# Patient Record
Sex: MALE | Race: WHITE | NOT HISPANIC OR LATINO | Employment: STUDENT | URBAN - METROPOLITAN AREA
[De-identification: names, ages, dates, MRNs, and addresses within clinical notes are randomized per-mention and may not be internally consistent; named-entity substitution may affect disease eponyms.]

---
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
## 2023-06-03 ENCOUNTER — TELEPHONE (OUTPATIENT)
Dept: DERMATOLOGY | Facility: CLINIC | Age: 18
End: 2023-06-03

## 2023-06-03 NOTE — TELEPHONE ENCOUNTER
Called home phone number since VM is not set up on cell    Spoke with Pt's mother and rescheduled 6/5 visit for virtual with Dr Sonam Kerr and put on AGGIE PATTON [facility]

## 2023-07-19 ENCOUNTER — TELEMEDICINE (OUTPATIENT)
Age: 18
End: 2023-07-19
Payer: COMMERCIAL

## 2023-07-19 DIAGNOSIS — L70.9 ACNE, UNSPECIFIED ACNE TYPE: Primary | ICD-10-CM

## 2023-07-19 PROCEDURE — 99204 OFFICE O/P NEW MOD 45 MIN: CPT | Performed by: DERMATOLOGY

## 2023-07-19 RX ORDER — ADAPALENE AND BENZOYL PEROXIDE .1; 2.5 G/100G; G/100G
1 GEL TOPICAL
Qty: 45 G | Refills: 2 | Status: SHIPPED | OUTPATIENT
Start: 2023-07-19

## 2023-07-19 RX ORDER — DOXYCYCLINE 100 MG/1
100 TABLET ORAL 2 TIMES DAILY
Qty: 60 TABLET | Refills: 2 | Status: SHIPPED | OUTPATIENT
Start: 2023-07-19 | End: 2023-10-17

## 2023-07-19 NOTE — PATIENT INSTRUCTIONS
Treatment plan:Based on a thorough discussion of this condition and the management approach to it (including a comprehensive discussion of the known risks, side effects and potential benefits of treatment), the patient (family) agrees to implement the following specific plan: Topical medications:              Night: Start Epiduo 0.1-2.5%cream topically at bedtime to face until told otherwise     Oral medications:        Morning and Night: Start Doxycycline 100 mg twice a day by mouth after meal and full glass of water for 2 months     Accutane discussed if no improvement    Cetaphil, dove or Neutrogena moisturizer and cleanser recommended twice daily   If too drying okay to apply moisturizer prior to applying topical medication   If still too drying contact office via my chart for alternative. DO NOT WAIT UNTIL NEXT APPOINtment   Oral medication can cause sun sensitivity. Wear sunscreen if outside and hat    Stop medication if going to beach   Follow up in 3 months       REMEMBER:  Always take your acne pills with lots of water! A pill stuck in your throat can cause significant burning and irritation. Drink a full glass of water to ensure the pill gets into your stomach. Avoid “popping” a pill right before bed, and stay upright for at least 1 hour after taking a pill.

## 2023-07-19 NOTE — PROGRESS NOTES
Virtual Regular Visit    Verification of patient location:    Patient is located at Home in the following state in which I hold an active license [place]      Assessment/Plan:    Problem List Items Addressed This Visit    None  Visit Diagnoses     Acne, unspecified acne type    -  Primary               Reason for visit is   Chief Complaint   Patient presents with   • Virtual Regular Visit        Encounter provider Kirsten Monge MD    Provider located at [address] [place] [address]  [phone]      Recent Visits  No visits were found meeting these conditions. Showing recent visits within past 7 days and meeting all other requirements  Today's Visits  Date Type Provider Dept   07/19/23 Telemedicine Kirsten Monge MD [facility]   Showing today's visits and meeting all other requirements  Future Appointments  No visits were found meeting these conditions. Showing future appointments within next 150 days and meeting all other requirements       The patient was identified by name and date of birth. Dawit Gerardo was informed that this is a telemedicine visit and that the visit is being conducted through the Taplet. He agrees to proceed. .  My office door was closed. The patient was notified the following individuals were present in the room Elisha J .  He acknowledged consent and understanding of privacy and security of the video platform. The patient has agreed to participate and understands they can discontinue the visit at any time. Patient is aware this is a billable service. Subjective  Dawit Gerardo is a 21 y.o. male present for acne  . HPI     History reviewed. No pertinent past medical history. History reviewed. No pertinent surgical history. No current outpatient medications on file. No current facility-administered medications for this visit.         No Known Allergies    Review of Systems    Video Exam    There were no vitals filed for this visit. Physical Exam     Visit Time  Total Visit Duration: 5.30 minutes        Patient Name: Dawit Gerardo  Encounter Date: 07/19/2023     Have you been cared for by a [facility]atologist in the last 3 years and, if so, which description applies to you? NO. I am considered a "new" patient and must complete all patient intake questions. I am MALE/not capable of bearing children. REVIEW OF SYSTEMS:  Have you recently had or currently have any of the following? · Recent fever or chills? No  · Any non-healing wound? No   PAST MEDICAL HISTORY:  Have you personally ever had or currently have any of the following? If "YES," then please provide more detail. · Skin cancer (such as Melanoma, Basal Cell Carcinoma, Squamous Cell Carcinoma? No  · Tuberculosis, HIV/AIDS, Hepatitis B or C: No  · Systemic Immunosuppression such as Diabetes, Biologic or Immunotherapy, Chemotherapy, Organ Transplantation, Bone Marrow Transplantation No  · Radiation Treatment No   FAMILY HISTORY:  Any "first degree relatives" (parent, brother, sister, or child) with the following? • Skin Cancer, Pancreatic or Other Cancer? No   PATIENT EXPERIENCE:    • Do you want the Dermatologist to perform a COMPLETE skin exam today including a clinical examination under the "bra and underwear" areas? NO  • If necessary, do we have your permission to call and leave a detailed message on your Preferred Phone number that includes your specific medical information? Yes      No Known Allergies No current outpatient medications on file. • Whom besides the patient is providing clinical information about today's encounter?   o NO ADDITIONAL HISTORIAN (patient alone provided history)    Physical Exam and Assessment/Plan by Diagnosis:    ACNE VULGARIS ("COMMON ACNE")    Physical Exam:  • Anatomic Location Affected:   Face   • Morphological Description:  o Open/Closed Comedones:  - Several ("Moderate")  o Inflammatory Papules/Pustules:  - Few ("Mild")  o Nodules:  - Many ("Severe")  o Scarring:  - Many ("Severe")  o Excoriations:  - Several ("Moderate")  o Local Skin Redness/Erythema:  - Many ("Severe")  o Local Skin Dryness/Scaling:  - No evidence ("Clear")  o Local Skin Dyspigmentation:  - No evidence ("Clear")      Additional History of Present Condition:  In the past tried Syla and Exposed acne kit. He states that has not really helped. Denies any itching or pain     Treatment plan:Based on a thorough discussion of this condition and the management approach to it (including a comprehensive discussion of the known risks, side effects and potential benefits of treatment), the patient (family) agrees to implement the following specific plan: Topical medications:              Night: Start Epiduo 0.1-2.5%cream topically at bedtime to face until told otherwise     Oral medications:        Morning and Night: Start Doxycycline 100 mg twice a day by mouth after meal and full glass of water for 2 months     · Accutane discussed if no improvement    · Cetaphil, dove or Neutrogena moisturizer and cleanser recommended twice daily   · If too drying okay to apply moisturizer prior to applying topical medication   · If still too drying contact office via my chart for alternative. DO NOT WAIT UNTIL NEXT APPOINtment   · Oral medication can cause sun sensitivity. Wear sunscreen if outside and hat    · Stop medication if going to beach   · Follow up in 3 months       REMEMBER:  Always take your acne pills with lots of water! A pill stuck in your throat can cause significant burning and irritation. Drink a full glass of water to ensure the pill gets into your stomach. Avoid “popping” a pill right before bed, and stay upright for at least 1 hour after taking a pill. ACNE:  WHAT ZIT ALL ABOUT? WHY DO I HAVE ACNE/PIMPLES? Your skin is made of layers.   To keep the skin from becoming dry and cracked, the skin needs oil. The oil is made in little wells in the deeper layers in the skin. People with acne have glands that make more oil and are more easily plugged, causing the glands to swell. Hormones, bacteria and your inherited tendency to have acne all play a role. The medical term for “pimples” is acne or acne vulgaris (vulgaris means “common”). Most people get some acne. Acne does not come from being dirty. Instead, it is an expected consequence of changes that occur during normal growth and development. Hormones, bacteria, and your family's tendency to have acne may all play a role. “Whiteheads” or “blackheads” are openings of the glands (glands are the oil factories) onto the surface of the skin. “Blackheads” are not caused by dirt blocking the pores; instead, they result from the oxidation reaction of oil and skin in the pores with the air (like a “rust” reaction). WHAT ABOUT STRESS? Stress does not “cause” acne but it can make it worse. Make sure you get enough sleep and daily exercise! WHAT ABOUT FOODS/DIET? Try to eat a balanced, healthy diet. Some people feel that certain foods worsen their acne. While there aren't many studies available on this question, severe dietary changes are unlikely to help your acne and may be harmful to the health of your skin. If you find that a certain food seems to aggravate your acne, you may consider avoiding that food. Discuss this with your physician! WHAT CAUSES MY ACNE? There are four contributors to acne--the body's natural oil (sebum), clogged pores, bacteria (with the scientific name Propionibacterium acnes, or P. acnes, for short), and the body's reaction to the bacteria living in the clogged pores (which causes inflammation). Here's what happens:    • Sebum is produced in the normal oil-making glands in the deeper layers of the skin and reaches the surface through the skin's pores.  An increase in certain hormones occurs around the time of puberty, and these hormones trigger the oil glands to produce increased amounts of sebum. • Pores with excess oil tend to become clogged more easily. • At the same time, P. acnes--one of the many types of bacteria that normally live on everyone's skin--thrives in the excess oil and causes a skin reaction (inflammation). • If a pore is clogged close to the surface, there is little inflammation. However, this results in the formation of “whiteheads” (closed comedones) or “blackheads” (open comedones) at the surface of the skin. • A plug that extends to, or forms a little deeper in the pore, or one that enlarges or ruptures may cause more inflammation. The result is red bumps (papules) and pus-filled pimples (pustules). • If plugging happens in the deepest skin layer, the inflammation may be even more severe, resulting in the formation of nodules or cysts. When these types of acne heal, they may leave behind discolored areas or true scars. SKIN HYGIENE:  HOW SHOULD I [facility] MY SKIN? Acne does not come from being dirty, however, washing your face is part of taking good care of your skin and will help keep your face clear. Good skin hygiene is, therefore, critical to support any acne treatment plan. Here are several specific suggestions for practicing good skin hygiene and keeping your skin looking its best:    • You should wash acne-prone skin TWICE A DAY: Once in the morning and once in the evening. This does include any showers you take that day, so do not overdo it! • Do not scrub the skin with a washcloth or loofah as these can irritate and inflame your acne. Acne does not come from “dirt”, so it is not necessary to scrub the skin clean. In fact, scrubbing may lead to dryness and irritation that makes the acne even worse and harder for patients to tolerate acne medications.    • Use a gentle facial moisturizing cleanser (Cetaphil Moisturizing Cleanser or Dove Fragrance-Free bar). Avoid using soaps like [place], [address], [address], or soft/liquid soaps as these products will dry your skin. • Do not use any over-the-counter “acne washes” without your doctor's specific instruction to do so. These products often contain salicylic acid or benzoyl peroxide. These ingredients can be helpful in clearing oil from the skin and reducing bacteria, but they may also be drying and can add to irritation. • Do not use exfoliating products with microbeads or brushes as these can cause irritation to the skin which can worsen the acne  • Facials and other treatments to remove, squeeze, or “clean out” pores, if done by a , can help the acne. They should not be done more than every 8 weeks  • Try not to “pop pimples” or pick at your acne as this can delay healing and may result in scarring or skin color changes (“dark spots”) that are often more noticeable than the acne itself. Picking/popping acne can also cause a serious skin infection. • Wash or change your pillow case once to twice a week, especially if you use products in your hair. • Wash the skin as soon as possible after playing sports or other activities that cause a lot of sweating. Also, pay attention to how your sports equipment (shoulder pads, helmet strap, etc.) might be making your acne worse. • When you use makeup, moisturizer, or sunscreen make sure that these products are labeled “non-comedogenic,” or “won't clog pores,” or “won't cause acne.”           WHAT ACNE TREATMENTS ARE AVAILABLE? Medications for acne try to stop the formation of new pimples by reducing or removing the oil, bacteria, and other things (like dead skin cells) that clog the pores. They can also decrease the inflammation or irritation response of the skin to bacteria. It may take from 6 to 8 weeks (about 2 months!) before you see any improvement and know if the medication is effective.  It takes the layers of skin this long to regenerate. Remember, these medications do not “cure” the condition--the acne improves because of the medication. Therefore, treatment must be continued in order to prevent the return of acne lesions. There are many types of acne treatments. Some are applied to the skin (“topical” medications) and some are taken by mouth (“oral” medications). In most cases of mild acne, the doctor will start with a topical medication. There are many different topical medications that are helpful for acne. If acne is more severe and it does not respond adequately to a topical medication, or if it covers large body surface areas such as the back and/or chest, oral antibiotics such as Doxycycline or Minocycline and/or oral hormone therapy such as Oral Contraceptive Pills or Spironolactone may be prescribed. In the most severe cases, isotretinoin (Accutane) may be used. In general, it is usually best to start with acne medications that are least likely to cause side effects but are at the same time capable of addressing the specific causes for the acne. Some patients have a good result with just one medication, but many will need to use a combination of treatments: two or more different topical agents or an oral medication plus a topical medication. Another treatment used for acne may include corticosteroid injections, which are used to help relieve pain, decrease the size, and encourage the healing of large, inflamed acne nodules. Also, dermatologists sometimes perform “acne surgery,” using a fine needle, a pointed blade, or an instrument known as a comedone extractor to mechanically clean out clogged pores. One must always weigh the risk for inducing a scar with the potential benefits of any procedure. Prior treatment with topical retinoids can “loosen” whiteheads and blackheads and make it easier to physically remove such lesions.      Heat-based devices, and light and laser therapy are being studied to see whether there is any role for such treatments in mild to moderate acne. At this time, there is not enough evidence to make general recommendations about their use. TOPICAL ACNE MEDICATIONS    WHAT KIND OF TOPICALS ARE THERE? • Benzoyl peroxide (BP) helps to fight inflammation and is anti-microbial (kills bacteria, viruses, and other microorganisms) and is believed to help prevent resistance of bacteria to topical antibiotics. A benzoyl peroxide “wash” may be recommended for use on large areas such as the chest and/or back. Mild irritation and dryness are common when first using benzoyl peroxide-containing products. Be careful because benzoyl peroxide can bleach towels and clothing! • Retinoids (such as adapalene, tretinoin, or tazarotene) unplug the oil glands by helping peel away the layers of skin and other things plugging the opening of the glands. Mild irritation and dryness are common when first using these products. Facial waxing and other skin procedures can lead to excessive irritation and should be avoided during retinoid therapy. • Antibiotics fight bacteria and help decrease inflammation. Topical antibiotics commonly used in acne include clindamycin, erythromycin, and combination agents (such as clindamycin/benzoyl peroxide or erythromycin/benzoyl peroxide). Mild irritation and dryness are common when first using these products. Typically, topical antibiotics should not be used alone as treatment for acne. • Other topical agents include salicylic acid, azelaic acid, dapsone, and sulfacetamide. Mild irritation and dryness can also occur when first using these products. USING YOUR TOPICAL TREATMENTS LIKE A PRO  • Apply topical medications only to clean, dry skin. Topical medications may lead to significant dryness of the affected areas. To minimize this, wait 15-20 minutes after washing before applying your topical medication. • These medications work deep in the skin to prevent new breakouts. “Spot treatment” of individual pimples does not do much. When applying topical medications to the face, use the “5-dot” method. Start by placing a small pea-sized amount of the medication on your finger. Then, place “dots” in each of five locations of your face: Mid-forehead, each cheek, nose, and chin. Next, rub the medication into the entire area of skin - not just on individual pimples! Try to avoid the delicate skin around your eyes and corners of your mouth. • The medications are not magic! They take weeks if not months to work. Be patient and use your medicine on a daily basis or as directed for six weeks before asking if your skin looks better. Try not to miss more than one or two days each week when using your medications. • If you are starting a new medication, then try using it “every other night” or even “every third night.” Gradually work up to [employer] a day.”  This will give your skin time to adjust.  • The same medications often come in various forms or formulations: Creams, ointments, lotions, gels, microspheres, or foams. Use the formulation that has been recommended and don't switch to other forms unless instructed. Some forms (such as alcohol based gels) may be more drying and less tolerable for certain skin types. • Sometimes individual medications are not as effective as a combination of two or more agents. The doctor may need to try several medications or combinations before finding the one that is best for that patient. • Moisturizer, sunscreen, and make-up may be used in conjunction with topical acne medications. In general, acne medications are applied first so they may directly contact the skin. Ask your physician to review specific application instructions! • It is especially important to always use sunscreen when using a topical retinoid or oral antibiotic. These drugs can make your skin more sensitive to the sun. In general, sunscreen gets applied AFTER any acne medications.   • Don't stop using your acne medications just because your acne got better. Remember, the acne is better because of the medication, and prevention is the franklin to treatment. ORAL ACNE MEDICATIONS    ORAL ANTIBIOTICS  Antibiotics include tetracycline-class medicines (which include the most commonly used oral antibiotics for acne, minocycline, and doxycycline), erythromycin, trimethoprim-sulfamethoxazole, and occasionally cephalexin or azithromycin. These drugs may decrease bacteria and inflammation, and they are most effective for moderate-to-severe “inflammatory” acne. A product containing benzoyl peroxide should be used along with these antibiotics to help decrease the possibility of microbial resistance. Always take your acne pills with lots of water! A pill stuck in your throat can cause significant burning and irritation. Drink a full glass of water to ensure the pill gets into your stomach. Avoid “popping” a pill right before bed, and stay upright for at least 1 hour after taking a pill. ORAL ISOTRETINOIN (used to be called the brand name “ACCUTANE”)  Isotretinoin, a derivative of vitamin A, is a powerful drug with several significant potential side effects. It is reserved for acne which is severe or when other medications have not worked well enough. It used to be sold under the brand name “Accutane” but now several versions exist.      HAVING PROBLEMS WITH ANY OF YOUR TREATMENTS? You should not be able to see any of the medicines on your face. If you can see a white film on your skin after you apply the medication, there is too much medicine in that area and you need to apply a thinner coat and make sure it is spread evenly on your face. If your skin gets too dry, you can apply a light (“non-comedogenic”) moisturizer on top of your medicine or you may switch to using the medicine every other day instead of every day.   If your skin is still too irritated, you may need to switch to a milder medication. If your skin is red and very itchy, you may be allergic to the medication and you should stop using it. WHEN AND WHERE TO CALL WITH CONCERNS  We are here to help! If you experience any unusual symptoms, then stop taking or using the medication and call our office at [phone] (SKIN). It is better to be safe than to be sorry!     Scribe Attestation    I,:  Diego Leger am acting as a scribe while in the presence of the attending physician.:       I,:  Zay Smiley MD personally performed the services described in this documentation    as scribed in my presence.:

## 2023-08-01 ENCOUNTER — OFFICE VISIT (OUTPATIENT)
Dept: FAMILY MEDICINE CLINIC | Facility: CLINIC | Age: 18
End: 2023-08-01
Payer: COMMERCIAL

## 2023-08-01 VITALS
HEART RATE: 48 BPM | DIASTOLIC BLOOD PRESSURE: 62 MMHG | OXYGEN SATURATION: 99 % | TEMPERATURE: 96 F | BODY MASS INDEX: 21.06 KG/M2 | SYSTOLIC BLOOD PRESSURE: 122 MMHG | RESPIRATION RATE: 18 BRPM | HEIGHT: 71 IN | WEIGHT: 150.4 LBS

## 2023-08-01 DIAGNOSIS — L70.9 ACNE, UNSPECIFIED ACNE TYPE: ICD-10-CM

## 2023-08-01 DIAGNOSIS — Z00.00 ANNUAL PHYSICAL EXAM: Primary | ICD-10-CM

## 2023-08-01 PROCEDURE — 99395 PREV VISIT EST AGE 18-39: CPT | Performed by: FAMILY MEDICINE

## 2023-08-01 NOTE — PROGRESS NOTES
[id number] [facility]    NAME: Noreen Kaufman  AGE: 21 y.o. SEX: male  : 2005     DATE: 2023     Assessment and Plan:     Problem List Items Addressed This Visit        Musculoskeletal and Integument    Acne     Patient follows with derm and is on medication         Other Visit Diagnoses     Annual physical exam    -  Primary          Immunizations and preventive care screenings were discussed with patient today. Appropriate education was printed on patient's after visit summary. Counseling:  Alcohol/drug use: discussed moderation in alcohol intake, the recommendations for healthy alcohol use, and avoidance of illicit drug use. Dental Health: discussed importance of regular tooth brushing, flossing, and dental visits. Injury prevention: discussed safety/seat belts, safety helmets, smoke detectors, carbon dioxide detectors, and smoking near bedding or upholstery. Sexual health: discussed sexually transmitted diseases, partner selection, use of condoms, avoidance of unintended pregnancy, and contraceptive alternatives. · Exercise: the importance of regular exercise/physical activity was discussed. Recommend exercise 3-5 times per week for at least 30 minutes. Return in about 1 year (around 2024) for Annual physical.     Chief Complaint:     Chief Complaint   Patient presents with   • Annual Exam      History of Present Illness:     Adult Annual Physical   Patient here for a comprehensive physical exam.   He is a student at [employer]. Running track and Eat. Diet and Physical Activity  · Diet/Nutrition: well balanced diet. · Exercise: moderate cardiovascular exercise.       Depression Screening  PHQ-2/9 Depression Screening    Little interest or pleasure in doing things: 0 - not at all  Feeling down, depressed, or hopeless: 0 - not at all  PHQ-2 Score: 0  PHQ-2 Interpretation: Negative depression screen          Review of Systems:     Review of Systems   Constitutional: Negative for chills and fever. HENT: Negative for congestion, postnasal drip, rhinorrhea and sinus pain. Eyes: Negative for photophobia and visual disturbance. Respiratory: Negative for cough and shortness of breath. Cardiovascular: Negative for chest pain, palpitations and leg swelling. Gastrointestinal: Negative for abdominal pain, constipation, diarrhea, nausea and vomiting. Genitourinary: Negative for difficulty urinating and dysuria. Musculoskeletal: Negative for arthralgias and myalgias. Skin: Negative for rash. Neurological: Negative for dizziness and syncope. Past Medical History:     History reviewed. No pertinent past medical history. Past Surgical History:     History reviewed. No pertinent surgical history. Social History:     Social History     Socioeconomic History   • Marital status: Single     Spouse name: None   • Number of children: None   • Years of education: None   • Highest education level: None   Occupational History   • None   Tobacco Use   • Smoking status: Never   • Smokeless tobacco: Never   Vaping Use   • Vaping Use: Never used   Substance and Sexual Activity   • Alcohol use: Never   • Drug use: Never   • Sexual activity: None   Other Topics Concern   • None   Social History Narrative   • None     Social Determinants of Health     Financial Resource Strain: Not on file   Food Insecurity: Not on file   Transportation Needs: Not on file   Physical Activity: Not on file   Stress: Not on file   Social Connections: Not on file   Intimate Partner Violence: Not At Risk (6/30/2022)    Humiliation, Afraid, Rape, and Kick questionnaire    • Fear of Current or Ex-Partner: No    • Emotionally Abused: No    • Physically Abused: No    • Sexually Abused: No   Housing Stability: Not on file      Family History:     History reviewed. No pertinent family history. Current Medications:     Current Outpatient Medications   Medication Sig Dispense Refill   • Adapalene-Benzoyl Peroxide 0.1-2.5 % gel Apply 1 Application topically daily at bedtime 45 g 2   • doxycycline (ADOXA) 100 MG tablet Take 1 tablet (100 mg total) by mouth 2 (two) times a day 60 tablet 2     No current facility-administered medications for this visit. Allergies:     No Known Allergies   Physical Exam:     /62 (BP Location: Left arm, Patient Position: Sitting, Cuff Size: Standard)   Pulse (!) 48   Temp (!) 96 °F (35.6 °C) (Tympanic)   Resp 18   Ht 5' 10.8" (1.798 m)   Wt 68.2 kg (150 lb 6.4 oz)   SpO2 99%   BMI 21.10 kg/m²     Physical Exam  Constitutional:       General: He is not in acute distress. Appearance: Normal appearance. He is not ill-appearing, toxic-appearing or diaphoretic. HENT:      Head: Normocephalic and atraumatic. Right Ear: Tympanic membrane and ear canal normal.      Left Ear: Tympanic membrane and ear canal normal.      Nose: Nose normal. No congestion. Mouth/Throat:      Mouth: Mucous membranes are moist.      Pharynx: Oropharynx is clear. No oropharyngeal exudate. Eyes:      Extraocular Movements: Extraocular movements intact. Conjunctiva/sclera: Conjunctivae normal.      Pupils: Pupils are equal, round, and reactive to light. Cardiovascular:      Rate and Rhythm: Normal rate and regular rhythm. Pulses: Normal pulses. Heart sounds: No murmur heard. Pulmonary:      Effort: Pulmonary effort is normal.      Breath sounds: Normal breath sounds. No wheezing, rhonchi or rales. Abdominal:      General: Bowel sounds are normal. There is no distension. Palpations: Abdomen is soft. Tenderness: There is no abdominal tenderness. Musculoskeletal:         General: No swelling or tenderness. Normal range of motion. Cervical back: Normal range of motion and neck supple. Skin:     General: Skin is warm and dry.       Capillary Refill: Capillary refill takes less than 2 seconds. Neurological:      General: No focal deficit present. Mental Status: He is alert and oriented to person, place, and time. Cranial Nerves: No cranial nerve deficit. Psychiatric:         Mood and Affect: Mood normal.         Behavior: Behavior normal.         Thought Content:  Thought content normal.          Jared Mckeon DO   [facility]

## 2023-10-25 ENCOUNTER — TELEMEDICINE (OUTPATIENT)
Age: 18
End: 2023-10-25
Payer: COMMERCIAL

## 2023-10-25 DIAGNOSIS — L70.9 ACNE, UNSPECIFIED ACNE TYPE: Primary | ICD-10-CM

## 2023-10-25 PROCEDURE — 99214 OFFICE O/P EST MOD 30 MIN: CPT | Performed by: DERMATOLOGY

## 2023-10-25 RX ORDER — DOXYCYCLINE 100 MG/1
100 TABLET ORAL 2 TIMES DAILY
Qty: 180 TABLET | Refills: 0 | Status: SHIPPED | OUTPATIENT
Start: 2023-10-25 | End: 2024-01-23

## 2023-10-25 NOTE — PATIENT INSTRUCTIONS
Topical medications:                                                           Night: Continue Epiduo 0.1-2.5%cream topically at bedtime to face until told otherwise      Oral medications:                             Morning and Night: Continue Doxycycline 100 mg twice a day by mouth after meal and full glass of water for the next 3  months          Advised to moisturize and cleanse face twice daily   Reassured it Is okay to apply Epiduo Monday through Friday and take the weekends off    Oral medication can cause sun sensitivity. Wear sunscreen if outside and hat     Accutane discussed if still no improvement   Follow up in March or April 2024                      REMEMBER:  Always take your acne pills with lots of water! A pill stuck in your throat can cause significant burning and irritation. Drink a full glass of water to ensure the pill gets into your stomach. Avoid “popping” a pill right before bed, and stay upright for at least 1 hour after taking a pill.

## 2023-10-25 NOTE — PROGRESS NOTES
Virtual Regular Visit    Verification of patient location:    Patient is located at Other in the following state in which I hold an active license [place]      Assessment/Plan:    Problem List Items Addressed This Visit        Musculoskeletal and Integument    Acne - Primary    Relevant Medications    doxycycline (ADOXA) 100 MG tablet            Reason for visit is No chief complaint on file. Encounter provider Ellen Hernandez MD    Provider located at [address] [place] [address]  [address] [facility] [address] [phone]      Recent Visits  No visits were found meeting these conditions. Showing recent visits within past 7 days and meeting all other requirements  Today's Visits  Date Type Provider Dept   10/25/23 Claritza CALI MD [facility]   Showing today's visits and meeting all other requirements  Future Appointments  No visits were found meeting these conditions. Showing future appointments within next 150 days and meeting all other requirements       The patient was identified by name and date of birth. Mulu Kiser was informed that this is a telemedicine visit and that the visit is being conducted through the [employer]. He agrees to proceed. .  My office door was closed. The patient was notified the following individuals were present in the room Elisha J.  He acknowledged consent and understanding of privacy and security of the video platform. The patient has agreed to participate and understands they can discontinue the visit at any time. Patient is aware this is a billable service. Subjective  Mulu Kiser is a 21 y.o. male following up on acne  . HPI     History reviewed. No pertinent past medical history. History reviewed. No pertinent surgical history.     Current Outpatient Medications   Medication Sig Dispense Refill   • doxycycline (ADOXA) 100 MG tablet Take 1 tablet (100 mg total) by mouth 2 (two) times a day 180 tablet 0   • Adapalene-Benzoyl Peroxide 0.1-2.5 % gel Apply 1 Application topically daily at bedtime 45 g 2     No current facility-administered medications for this visit. No Known Allergies    Review of Systems    Video Exam    There were no vitals filed for this visit. Physical Exam     Visit Time  Total Visit Duration: 10 minutes   Patient Name: Vonnie Schaumann  Encounter Date: 10/25/2023     Have you been cared for by a [facility]ologist in the last 3 years and, if so, which description applies to you? Yes. I have been here within the last 3 years, and my medical history has NOT changed since that time. I am MALE/not capable of bearing children. REVIEW OF SYSTEMS:  Have you recently had or currently have any of the following? No changes in my recent health. PAST MEDICAL HISTORY:  Have you personally ever had or currently have any of the following? If "YES," then please provide more detail. No changes in my medical history. HISTORY OF IMMUNOSUPPRESSION: Do you have a history of any of the following:  Systemic Immunosuppression such as Diabetes, Biologic or Immunotherapy, Chemotherapy, Organ Transplantation, Bone Marrow Transplantation? No     Answering "YES" requires the addition of the dotphrase "IMMUNOSUPPRESSED" as the first diagnosis of the patient's visit. FAMILY HISTORY:  Any "first degree relatives" (parent, brother, sister, or child) with the following? No changes in my family's known health. PATIENT EXPERIENCE:    Do you want the Dermatologist to perform a COMPLETE skin exam today including a clinical examination under the "bra and underwear" areas? NO  If necessary, do we have your permission to call and leave a detailed message on your Preferred Phone number that includes your specific medical information?   Yes      No Known Allergies   Current Outpatient Medications:   •  doxycycline (ADOXA) 100 MG tablet, Take 1 tablet (100 mg total) by mouth 2 (two) times a day, Disp: 180 tablet, Rfl: 0  •  Adapalene-Benzoyl Peroxide 0.1-2.5 % gel, Apply 1 Application topically daily at bedtime, Disp: 45 g, Rfl: 2          Whom besides the patient is providing clinical information about today's encounter? NO ADDITIONAL HISTORIAN (patient alone provided history)    Physical Exam and Assessment/Plan by Diagnosis:    ACNE VULGARIS ("COMMON ACNE")    Physical Exam:  Anatomic Location Affected:  face   Morphological Description:  Open/Closed Comedones:  Few ("Mild")  Inflammatory Papules/Pustules:  Few ("Mild")  Nodules:  Several ("Moderate")  Scarring:  Several ("Moderate")  Excoriations:  Few ("Mild")  Local Skin Redness/Erythema:  Several ("Moderate")  Local Skin Dryness/Scaling:  Few ("Mild")  Local Skin Dyspigmentation:  Rare ("Almost Clear")      Additional History of Present Condition:  last seen 7/2023. Prescribed epiduo to apply at bedtime, doxycycline 100 bid. He states his face does look better. Breakouts have been minimal. He states he noticed when he sweats his face does get itchy. Denies any side effects with topical and oral medication     Treatment plan:Based on a thorough discussion of this condition and the management approach to it (including a comprehensive discussion of the known risks, side effects and potential benefits of treatment), the patient (family) agrees to implement the following specific plan:   Topical medications:                                                           Night: Continue Epiduo 0.1-2.5%cream topically at bedtime to face until told otherwise      Oral medications:                             Morning and Night: Continue Doxycycline 100 mg twice a day by mouth after meal and full glass of water for the next 3  months          Advised to moisturize and cleanse face twice daily   Reassured it Is okay to apply Epiduo Monday through Friday and take the weekends off    Oral medication can cause sun sensitivity. Wear sunscreen if outside and hat     Accutane discussed if still no improvement   Follow up in March or April 2024                      REMEMBER:  Always take your acne pills with lots of water! A pill stuck in your throat can cause significant burning and irritation. Drink a full glass of water to ensure the pill gets into your stomach. Avoid “popping” a pill right before bed, and stay upright for at least 1 hour after taking a pill.     Scribe Attestation    I,:  Sarah Schofield am acting as a scribe while in the presence of the attending physician.:       I,:  Eddi Watt MD personally performed the services described in this documentation    as scribed in my presence.:

## 2023-12-18 ENCOUNTER — OFFICE VISIT (OUTPATIENT)
Dept: FAMILY MEDICINE CLINIC | Facility: CLINIC | Age: 18
End: 2023-12-18
Payer: COMMERCIAL

## 2023-12-18 ENCOUNTER — APPOINTMENT (OUTPATIENT)
Dept: LAB | Facility: CLINIC | Age: 18
End: 2023-12-18
Payer: COMMERCIAL

## 2023-12-18 VITALS
WEIGHT: 158.4 LBS | OXYGEN SATURATION: 98 % | DIASTOLIC BLOOD PRESSURE: 66 MMHG | TEMPERATURE: 96.6 F | HEART RATE: 57 BPM | SYSTOLIC BLOOD PRESSURE: 120 MMHG | BODY MASS INDEX: 22.18 KG/M2 | HEIGHT: 71 IN | RESPIRATION RATE: 16 BRPM

## 2023-12-18 DIAGNOSIS — G44.52 NEW DAILY PERSISTENT HEADACHE: Primary | ICD-10-CM

## 2023-12-18 DIAGNOSIS — G44.52 NEW DAILY PERSISTENT HEADACHE: ICD-10-CM

## 2023-12-18 LAB
ALBUMIN SERPL BCP-MCNC: 4.2 G/DL (ref 3.5–5)
ALP SERPL-CCNC: 111 U/L (ref 34–104)
ALT SERPL W P-5'-P-CCNC: 36 U/L (ref 7–52)
ANION GAP SERPL CALCULATED.3IONS-SCNC: 5 MMOL/L
AST SERPL W P-5'-P-CCNC: 46 U/L (ref 13–39)
BASOPHILS # BLD AUTO: 0.02 THOUSANDS/ÂΜL (ref 0–0.1)
BASOPHILS NFR BLD AUTO: 0 % (ref 0–1)
BILIRUB SERPL-MCNC: 0.79 MG/DL (ref 0.2–1)
BUN SERPL-MCNC: 17 MG/DL (ref 5–25)
CALCIUM SERPL-MCNC: 9.5 MG/DL (ref 8.4–10.2)
CHLORIDE SERPL-SCNC: 102 MMOL/L (ref 96–108)
CO2 SERPL-SCNC: 32 MMOL/L (ref 21–32)
CREAT SERPL-MCNC: 0.72 MG/DL (ref 0.6–1.3)
EOSINOPHIL # BLD AUTO: 0.06 THOUSAND/ÂΜL (ref 0–0.61)
EOSINOPHIL NFR BLD AUTO: 1 % (ref 0–6)
ERYTHROCYTE [DISTWIDTH] IN BLOOD BY AUTOMATED COUNT: 12.2 % (ref 11.6–15.1)
GFR SERPL CREATININE-BSD FRML MDRD: 134 ML/MIN/1.73SQ M
GLUCOSE P FAST SERPL-MCNC: 91 MG/DL (ref 65–99)
HCT VFR BLD AUTO: 40.4 % (ref 36.5–49.3)
HGB BLD-MCNC: 13.3 G/DL (ref 12–17)
IMM GRANULOCYTES # BLD AUTO: 0.01 THOUSAND/UL (ref 0–0.2)
IMM GRANULOCYTES NFR BLD AUTO: 0 % (ref 0–2)
LYMPHOCYTES # BLD AUTO: 1.47 THOUSANDS/ÂΜL (ref 0.6–4.47)
LYMPHOCYTES NFR BLD AUTO: 30 % (ref 14–44)
MAGNESIUM SERPL-MCNC: 2 MG/DL (ref 1.9–2.7)
MCH RBC QN AUTO: 32.9 PG (ref 26.8–34.3)
MCHC RBC AUTO-ENTMCNC: 32.9 G/DL (ref 31.4–37.4)
MCV RBC AUTO: 100 FL (ref 82–98)
MONOCYTES # BLD AUTO: 0.55 THOUSAND/ÂΜL (ref 0.17–1.22)
MONOCYTES NFR BLD AUTO: 11 % (ref 4–12)
NEUTROPHILS # BLD AUTO: 2.81 THOUSANDS/ÂΜL (ref 1.85–7.62)
NEUTS SEG NFR BLD AUTO: 58 % (ref 43–75)
NRBC BLD AUTO-RTO: 0 /100 WBCS
PLATELET # BLD AUTO: 221 THOUSANDS/UL (ref 149–390)
PMV BLD AUTO: 10.1 FL (ref 8.9–12.7)
POTASSIUM SERPL-SCNC: 4.2 MMOL/L (ref 3.5–5.3)
PROT SERPL-MCNC: 6.8 G/DL (ref 6.4–8.4)
RBC # BLD AUTO: 4.04 MILLION/UL (ref 3.88–5.62)
SODIUM SERPL-SCNC: 139 MMOL/L (ref 135–147)
TSH SERPL DL<=0.05 MIU/L-ACNC: 1.53 UIU/ML (ref 0.45–4.5)
WBC # BLD AUTO: 4.92 THOUSAND/UL (ref 4.31–10.16)

## 2023-12-18 PROCEDURE — 80053 COMPREHEN METABOLIC PANEL: CPT

## 2023-12-18 PROCEDURE — 99214 OFFICE O/P EST MOD 30 MIN: CPT | Performed by: FAMILY MEDICINE

## 2023-12-18 PROCEDURE — 36415 COLL VENOUS BLD VENIPUNCTURE: CPT

## 2023-12-18 PROCEDURE — 85025 COMPLETE CBC W/AUTO DIFF WBC: CPT

## 2023-12-18 PROCEDURE — 83735 ASSAY OF MAGNESIUM: CPT

## 2023-12-18 PROCEDURE — 84443 ASSAY THYROID STIM HORMONE: CPT

## 2023-12-18 RX ORDER — PREDNISONE 20 MG/1
40 TABLET ORAL DAILY
Qty: 10 TABLET | Refills: 0 | Status: SHIPPED | OUTPATIENT
Start: 2023-12-18 | End: 2023-12-23

## 2023-12-18 NOTE — PROGRESS NOTES
Amrit Dolan 2005 male MRN: [id number]    Family Medicine Acute Visit    ASSESSMENT/PLAN  Problem List Items Addressed This Visit          Other    New daily persistent headache - Primary    Relevant Medications    predniSONE 20 mg tablet    Other Relevant Orders    Comprehensive metabolic panel    CBC and differential    TSH, 3rd generation with Free T4 reflex    Magnesium       Rafa has headache unrelieved by OTC medication. Will try to break cycle with a short course of steroid. Get labs done. If not improved will then check imaging, referral to neuro, trial of additional medication. ER precautions reviewed.       No future appointments.       SUBJECTIVE  CC: Headache (Sometimes dizzy, and nauseated before going to sleep for 2-3 weeks)      HPI:  Amrit Dolan is a 18 y.o. male who presents for headache  Reports a few weeks ago was starting to get headaches, feels like they are getting worse. They are almost daily. Wakes up with headache that lasts all day. He tried otc migraine medicine, tried advil which did not give him any relief. No trauma or injury.  He does feel some nausea as well.     Review of Systems   Constitutional:  Negative for chills and fever.   HENT:  Negative for congestion, postnasal drip, rhinorrhea and sinus pain.    Eyes:  Negative for photophobia and visual disturbance.   Respiratory:  Negative for cough and shortness of breath.    Cardiovascular:  Negative for chest pain, palpitations and leg swelling.   Gastrointestinal:  Positive for nausea. Negative for abdominal pain, constipation, diarrhea and vomiting.   Genitourinary:  Negative for difficulty urinating and dysuria.   Musculoskeletal:  Negative for arthralgias and myalgias.   Skin:  Negative for rash.   Neurological:  Positive for headaches. Negative for dizziness and syncope.       Historical Information   The patient history was reviewed as follows:  No past medical history on file.      No past surgical  "history on file.  No family history on file.   Social History   Social History     Substance and Sexual Activity   Alcohol Use Never     Social History     Substance and Sexual Activity   Drug Use Never     Social History     Tobacco Use   Smoking Status Never   Smokeless Tobacco Never       Medications:     Current Outpatient Medications:     Adapalene-Benzoyl Peroxide 0.1-2.5 % gel, Apply 1 Application topically daily at bedtime, Disp: 45 g, Rfl: 2    doxycycline (ADOXA) 100 MG tablet, Take 1 tablet (100 mg total) by mouth 2 (two) times a day, Disp: 180 tablet, Rfl: 0    predniSONE 20 mg tablet, Take 2 tablets (40 mg total) by mouth daily for 5 days, Disp: 10 tablet, Rfl: 0    No Known Allergies    OBJECTIVE  Vitals:   Vitals:    12/18/23 0827   BP: 120/66   BP Location: Left arm   Patient Position: Sitting   Cuff Size: Standard   Pulse: 57   Resp: 16   Temp: (!) 96.6 °F (35.9 °C)   TempSrc: Tympanic   SpO2: 98%   Weight: 71.8 kg (158 lb 6.4 oz)   Height: 5' 10.8\" (1.798 m)         Physical Exam  Constitutional:       Appearance: He is well-developed.   HENT:      Head: Normocephalic and atraumatic.      Right Ear: External ear normal.      Left Ear: External ear normal.      Nose: No congestion or rhinorrhea.   Eyes:      Extraocular Movements: Extraocular movements intact.      Conjunctiva/sclera: Conjunctivae normal.      Pupils: Pupils are equal, round, and reactive to light.   Cardiovascular:      Rate and Rhythm: Normal rate and regular rhythm.      Heart sounds: Normal heart sounds. No murmur heard.  Pulmonary:      Effort: Pulmonary effort is normal. No respiratory distress.      Breath sounds: Normal breath sounds. No wheezing.   Musculoskeletal:         General: Normal range of motion.      Cervical back: Normal range of motion and neck supple.   Skin:     General: Skin is warm and dry.   Neurological:      Mental Status: He is alert and oriented to person, place, and time.   Psychiatric:         Mood " and Affect: Mood normal.         Behavior: Behavior normal.                    Mary Gabriel, DO    12/18/2023

## 2023-12-27 DIAGNOSIS — G44.52 NEW DAILY PERSISTENT HEADACHE: Primary | ICD-10-CM

## 2024-03-06 ENCOUNTER — PATIENT MESSAGE (OUTPATIENT)
Age: 19
End: 2024-03-06

## 2024-03-06 DIAGNOSIS — L70.9 ACNE, UNSPECIFIED ACNE TYPE: ICD-10-CM

## 2024-03-07 RX ORDER — ADAPALENE AND BENZOYL PEROXIDE GEL, 0.1%/2.5% 1; 25 MG/G; MG/G
1 GEL TOPICAL
Qty: 45 G | Refills: 2 | Status: SHIPPED | OUTPATIENT
Start: 2024-03-07

## 2024-03-28 ENCOUNTER — TELEMEDICINE (OUTPATIENT)
Dept: DERMATOLOGY | Facility: CLINIC | Age: 19
End: 2024-03-28
Payer: COMMERCIAL

## 2024-03-28 DIAGNOSIS — L70.0 ACNE VULGARIS: Primary | ICD-10-CM

## 2024-03-28 PROCEDURE — 99214 OFFICE O/P EST MOD 30 MIN: CPT | Performed by: NURSE PRACTITIONER

## 2024-03-28 NOTE — PROGRESS NOTES
"[facility] Note     Patient Name: Amrit Dolan  Encounter Date: 3/28/24     Have you been cared for by a [facility] Dermatologist in the last 3 years and, if so, which description applies to you?    Yes.  I have been here within the last 3 years, and my medical history has NOT changed since that time.  I am MALE/not capable of bearing children.    REVIEW OF SYSTEMS:  Have you recently had or currently have any of the following? No changes in my recent health.   PAST MEDICAL HISTORY:  Have you personally ever had or currently have any of the following?  If \"YES,\" then please provide more detail. No changes in my medical history.   HISTORY OF IMMUNOSUPPRESSION: Do you have a history of any of the following:  Systemic Immunosuppression such as Diabetes, Biologic or Immunotherapy, Chemotherapy, Organ Transplantation, Bone Marrow Transplantation?  No     Answering \"YES\" requires the addition of the dotphrase \"IMMUNOSUPPRESSED\" as the first diagnosis of the patient's visit.   FAMILY HISTORY:  Any \"first degree relatives\" (parent, brother, sister, or child) with the following?    No changes in my family's known health.   PATIENT EXPERIENCE:    If necessary, do we have your permission to call and leave a detailed message on your Preferred Phone number that includes your specific medical information?  Yes      No Known Allergies   Current Outpatient Medications:     Adapalene-Benzoyl Peroxide 0.1-2.5 % gel, Apply 1 Application topically daily at bedtime, Disp: 45 g, Rfl: 2          Whom besides the patient is providing clinical information about today's encounter?   NO ADDITIONAL HISTORIAN (patient alone provided history)    Physical Exam and Assessment/Plan by Diagnosis:      ACNE VULGARIS    Physical Exam:  Anatomic Locations Involved: Face and Back  Global Assessment: ALMOST CLEAR: A few scattered comedones and a few small inflammatory papules.   Scarring Present? NONE  Pertinent " "Positives:  Pertinent Negatives:    Additional History of Present Condition:  Patient last seen by Dr. Evangelista on 10/25/23.  He had completed 6 months of oral doxycycline, 100 mg BID.  Patient states last dose was 1 month ago.  He continues to use Epiduo at night.  Patient denies any recent acne flares.  He notes residual bumps primarily along chin and upper back, but no nodules/pustules.  Patient cleanses with goat milk soap and Cetaphil.         TODAY'S PLAN:     PRESCRIPTION MANAGEMENT:  Several treatment options were discussed including topical retinoids and their side effects.     Skin Hygiene:      Wash affected areas (face, chest, and back) TWICE A DAY with a mild cleanser such as Cerave, Cetaphil.  Use only mild cleansers (hypoallergenic and without fragrances) and fragrance free detergent (not \"unscented\" products which contain a masking agent); we discussed avoiding irritants/fragranced products.  Apply a good oil-free facial moisturizer AT LEAST TWO TIMES A DAY \" such as Cerave, Cetaphil.  Minimize the application of oils and cosmetics to the affected skin.  This includes HAIR PRODUCTS such as \"leave in\" conditioners.  Unless the product specifically states that it \"won't cause acne,\" \"won't clog pores,\" and/or \"is non-comedogenic\" then it may actually CAUSE acne.  If you smoke, STOP. Nicotine increases sebum retention and increased scale within the follicles, forming comedones (blackheads and whiteheads).  Abrasive treatments such as dermabrasion and spa facials may aggravate inflammatory acne.  Do NOT scratch or pick your acne bumps.  The evidence that diet directly affects acne remains weak.  However, diet does affect your overall health.  Eat plenty of fresh fruit and vegetables.  Avoid protein or amino acid supplements, particularly if they contain leucine. Consider a low-glycemic, low-protein and low-dairy diet.  Be mindful that certain medications may cause of aggravate acne.  Make sure to tell " your Dermatologist if you start a new prescription, nutritional supplement, and/or herbal remedy.      MORNING Topical Regimen:      NONE.      EVENING Topical Regimen:      Stop Epiduo   Start Tretinoin 0.025% cream AT LEAST 1 HOUR BEFORE BEDTIME:  Evenly spread a SINGLE pea-sized amount of this medication over your entire face, avoiding the eyes and corners of the mouth.      SYSTEMIC Strategies:      NONE     Follow up in 6 months for re-evaluation.  Patient advised to call sooner with any questions or issues.   MEDICAL DECISION MAKING  Treatment Goal:  Resolution of the CHRONIC condition.       Chronic condition is NOT at treatment goal.  It is progressing along its expected course OR is poorly-controlled.          Virtual Regular Visit    Verification of patient location:    Patient is located at Home in the following state in which I hold an active license [place]      Assessment/Plan:    Problem List Items Addressed This Visit          Musculoskeletal and Integument    Acne - Primary    Relevant Medications    tretinoin (RETIN-A) 0.025 % cream            Reason for visit is   Chief Complaint   Patient presents with    Virtual Regular Visit          Encounter provider DEVANTE Simms    Provider located at [facility]  [address] [address] [phone]      Recent Visits  No visits were found meeting these conditions.  Showing recent visits within past 7 days and meeting all other requirements  Today's Visits  Date Type Provider Dept   03/28/24 Telemedicine DEVANTE Simms [facility]   Showing today's visits and meeting all other requirements  Future Appointments  No visits were found meeting these conditions.  Showing future appointments within next 150 days and meeting all other requirements       The patient was identified by name and date of birth. Amrit Dolan was informed that this is a telemedicine  visit and that the visit is being conducted through the Epic Embedded platform. He agrees to proceed..  My office door was closed. No one else was in the room.  He acknowledged consent and understanding of privacy and security of the video platform. The patient has agreed to participate and understands they can discontinue the visit at any time.    Patient is aware this is a billable service.     Subjective  Amrit Dolan is a 18 y.o. male with history of acne.    No past medical history on file.    No past surgical history on file.    Current Outpatient Medications   Medication Sig Dispense Refill    tretinoin (RETIN-A) 0.025 % cream Apply topically daily at bedtime 45 g 1     No current facility-administered medications for this visit.        No Known Allergies  Video Exam    There were no vitals filed for this visit.    Visit Time  Total Visit Duration: 20 minutes

## 2024-05-21 ENCOUNTER — OFFICE VISIT (OUTPATIENT)
Dept: PHYSICAL THERAPY | Facility: CLINIC | Age: 19
End: 2024-05-21
Payer: COMMERCIAL

## 2024-05-21 DIAGNOSIS — M76.62 LEFT ACHILLES TENDINITIS: Primary | ICD-10-CM

## 2024-05-21 PROCEDURE — 97161 PT EVAL LOW COMPLEX 20 MIN: CPT | Performed by: PHYSICAL THERAPIST

## 2024-05-21 PROCEDURE — 97140 MANUAL THERAPY 1/> REGIONS: CPT | Performed by: PHYSICAL THERAPIST

## 2024-05-21 NOTE — PROGRESS NOTES
PT Evaluation     Today's date: 2024  Patient name: Amrit Dolan  : 2005  MRN: [id number]  Referring provider: Mary Gabriel DO  Dx:   Encounter Diagnosis     ICD-10-CM    1. Left Achilles tendinitis  M76.62                      Assessment  Impairments: abnormal muscle firing, abnormal or restricted ROM, lacks appropriate home exercise program and pain with function    Assessment details: Amrit Dolan is a 18 y.o. male presenting to physical therapy with left calf/achilles pain.  He is likely dealing with achilles tendinitis due to large amounts of running and slightly gastroc-soleus tightness. Patient presents with pain, decreased range of motion and decreased tolerance to activity. Due to these impairments patient has difficulty performing sport related activities and recreational activities.    Goals    Impairment Goals:  1.) Pt will have decreased sx at rest to 0/10 in 2-3 weeks.  2.) Pt will have improved ankle strength by 1/2 MMT in 3-4 weeks.  3.) Pt will have decreased tenderness to palpation to soleus by 50% in 1-2 weeks.  4.) Pt will have improved ankle range of motion to WNL as compared B in 4-6 weeks.    Functional Goals:  1.) Pt will be independent in their home exercise program in 1 week.  2.) Pt will be able to run on flat surfaces without difficulty or pain in 2-4 weeks.  3.) Pt will be able to resume all ADL's without calf pain in 1-2 weeks.  4.) Pt will have an improved FOTO score of 100/100 in 2-4 weeks.    Plan  Patient would benefit from: skilled physical therapy    Planned therapy interventions: home exercise program, therapeutic exercise, strengthening, stretching, patient education, behavior modification, manual therapy, work reintegration, gait training, neuromuscular re-education, graded activity and graded exercise    Frequency: 1x week  Plan of Care beginning date: 2024  Plan of Care expiration date: 2024        Subjective  "Evaluation    History of Present Illness  Mechanism of injury: Scot is a 18 year old male presenting to physical therapy with left calf/achilles pain. 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 At his peak he puts about 85 miles of running in per week. He runs in [employer] and Chris shoes, alternative wearing them, and gets new pairs 4x/year.     He reports that this pain has been going on for a while but this time it did not resolve during his off weeks. He has a two week break since its the end of the season but is hopeful to get back into his training routine starting next week, depending on his pain.      About 4 weeks ago his calf pain pressure and tightness started up again, no prior injuries related to this. He went to the  and was dx with mild achilles tendinitis. He explains that they \"scraped\" him and had him doing ice baths and stretches, this seemed to help. He denies pain on the right side.     Eases: ice, \"scraping\", rest, stretching  Patient Goals  Patient goals for therapy: decreased pain and return to sport/leisure activities    Pain  Current pain rating: 3  At best pain ratin  At worst pain ratin  Quality: sharp and tight  Relieving factors: change in position and ice  Aggravating factors: running  Progression: no change        Objective     Palpation   Left   No palpable tenderness to the lateral gastrocnemius.   Tenderness of the medial gastrocnemius and soleus.     Tenderness   Left Ankle/Foot   Tenderness in the Achilles insertion. No tenderness in the plantar fascia.     Active Range of Motion   Left Ankle/Foot   Dorsiflexion (ke): 6 degrees   Dorsiflexion (kf): 12 degrees   Plantar flexion: WFL    Right Ankle/Foot   Dorsiflexion (ke): 10 degrees   Dorsiflexion (kf): 18 degrees   Plantar flexion: WFL    Strength/Myotome Testing     Left Ankle/Foot   Dorsiflexion: 5  Plantar flexion: 5 (Slight achilles pain)  Inversion: 5  Eversion: " 5    Right Ankle/Foot   Dorsiflexion: 5  Plantar flexion: 5  Inversion: 5  Eversion: 5    Tests     Left Hip   Negative Ely's.     Right Hip   Negative Ely's.          Precautions: None      Manuals 5/21            IASTM L Gastroc/Soleus RN            Tiger tail calf RN                                      Neuro Re-Ed                                                                                                        Ther Ex             Standing gastroc/soleus s' 5x15'' ea            Foam rolling calf hep            Ice prn hep            Self IASTM hep                                                                Ther Activity                                       Gait Training                                       Modalities

## 2024-05-28 ENCOUNTER — OFFICE VISIT (OUTPATIENT)
Dept: PHYSICAL THERAPY | Facility: CLINIC | Age: 19
End: 2024-05-28
Payer: COMMERCIAL

## 2024-05-28 DIAGNOSIS — M76.62 LEFT ACHILLES TENDINITIS: Primary | ICD-10-CM

## 2024-05-28 PROCEDURE — 97140 MANUAL THERAPY 1/> REGIONS: CPT | Performed by: PHYSICAL THERAPIST

## 2024-05-28 PROCEDURE — 97116 GAIT TRAINING THERAPY: CPT | Performed by: PHYSICAL THERAPIST

## 2024-05-28 NOTE — PROGRESS NOTES
Daily Note     Today's date: 2024  Patient name: Amrit Dolan  : 2005  MRN: [id number]  Referring provider: Mary Gabriel DO  Dx:   Encounter Diagnosis     ICD-10-CM    1. Left Achilles tendinitis  M76.62                      Subjective: Scot explains that he has been self treating with scraping, cupping and stretching but still feels achilles discomfort with quick, jarring changes in direction.      Objective: See treatment diary below      Assessment: Tolerated treatment well. Patient demonstrated fatigue post treatment and exhibited good technique with therapeutic exercises. Scot responded well to all treatment provided this morning, IASTM revealed more tenderness along the medial calf as it transitioned into his achilles. Observed his running form, notable weight shift on to his right ankle but midfoot strike pattern was observed. Educated on krysta, pain during his 50% jog over 200 feet. Encouraged him to refrain from running again until the weekend and to see how his pain responds at that time, encouraged the continuation of cross training.       Plan: Continue per plan of care.      Precautions: None      Manuals            IASTM L Gastroc/Soleus RN RN           Tiger tail calf RN RN                                     Neuro Re-Ed                                                                                                        Ther Ex             Standing gastroc/soleus s' 5x15'' ea Pro stretch 3x30''           Foam rolling calf hep            Ice prn hep            Self IASTM hep                         Y balance                                       Ther Activity                                       Gait Training             Running form  5' midfoot strike pattern           Krysta education  RN           Modalities

## 2024-06-05 ENCOUNTER — OFFICE VISIT (OUTPATIENT)
Dept: PHYSICAL THERAPY | Facility: CLINIC | Age: 19
End: 2024-06-05
Payer: COMMERCIAL

## 2024-06-05 DIAGNOSIS — M76.62 LEFT ACHILLES TENDINITIS: Primary | ICD-10-CM

## 2024-06-05 PROCEDURE — 97140 MANUAL THERAPY 1/> REGIONS: CPT | Performed by: PHYSICAL THERAPIST

## 2024-06-05 PROCEDURE — 97535 SELF CARE MNGMENT TRAINING: CPT | Performed by: PHYSICAL THERAPIST

## 2024-06-05 NOTE — PROGRESS NOTES
Daily Note     Today's date: 2024  Patient name: Amrit Dolan  : 2005  MRN: [id number]  Referring provider: Mary Gabriel DO  Dx:   Encounter Diagnosis     ICD-10-CM    1. Left Achilles tendinitis  M76.62                      Subjective: Scot explains that his running has been going well, he is up to 5 miles and his pain stays steady at a 2-3/10, his pain does not increase during the run.       Objective: See treatment diary below      Assessment: Tolerated treatment well. Patient demonstrated fatigue post treatment and exhibited good technique with therapeutic exercises. Added in single leg eccentrics and encouraged Scot to continue running as long as pain does not increase past a 2-3/10. Also educated on ways to progress single leg balance with off loading during single leg squats and RDL's. Encouraged him to pay attention to his symptoms after his run today and progress only if the pain decreases. Educated on the benefit from eccentric loading and tendinpathies.       Plan: Continue per plan of care.      Precautions: None      Manuals           IASTM L Gastroc/Soleus RN RN RN          Tiger tail calf RN RN RN                                    Neuro Re-Ed                                                                                                        Ther Ex             Standing gastroc/soleus s' 5x15'' ea Pro stretch 3x30'' Pro stretch 3x30''          Foam rolling calf hep            Ice prn hep            Self IASTM hep                         Y balance             Eccentric heel raises   2x10                       Ther Activity                                       Gait Training             Running form  5' midfoot strike pattern           Krysta education  RN           Education on nature of condition   RN          Modalities

## 2024-06-18 ENCOUNTER — OFFICE VISIT (OUTPATIENT)
Dept: PHYSICAL THERAPY | Facility: CLINIC | Age: 19
End: 2024-06-18
Payer: COMMERCIAL

## 2024-06-18 DIAGNOSIS — M76.62 LEFT ACHILLES TENDINITIS: Primary | ICD-10-CM

## 2024-06-18 PROCEDURE — 97140 MANUAL THERAPY 1/> REGIONS: CPT | Performed by: PHYSICAL THERAPIST

## 2024-06-18 PROCEDURE — 97110 THERAPEUTIC EXERCISES: CPT | Performed by: PHYSICAL THERAPIST

## 2024-06-18 NOTE — PROGRESS NOTES
Daily Note     Today's date: 2024  Patient name: Amrit Dolan  : 2005  MRN: [id number]  Referring provider: Mary Gabriel DO  Dx:   Encounter Diagnosis     ICD-10-CM    1. Left Achilles tendinitis  M76.62           Start Time: 1145  Stop Time: 1225  Total time in clinic (min): 40 minutes    Subjective: Scot explains that his calf has been feeling better since the weekend, he felt a slight flare in symptoms after wearing some dress shoes. He has been able to resume his normal distances in running without much limitation. He alternates cupping and scraping his calf which has been helping greatly.      Objective: See treatment diary below      Assessment: Tolerated treatment well. Patient demonstrated fatigue post treatment and exhibited good technique with therapeutic exercises. Had a conversation with Scot regarding his continued self treatment, stretches, exercising and icing after his runs. Will plan on putting PT on hold for the next 2 weeks to see how he responds to his 7-8 mile total increase in that time frame. Will schedule more visits at that point if necessary, given that he is independent with his manual treatment for the most part.       Plan:  Hold on PT at this point.     Precautions: None      Manuals          IASTM L Gastroc/Soleus RN RN RN RN         Tiger tail calf RN RN RN RN                                   Neuro Re-Ed                                                                                                        Ther Ex             Standing gastroc/soleus s' 5x15'' ea Pro stretch 3x30'' Pro stretch 3x30'' Pro stretch 3x30''         Foam rolling calf hep   5'         Ice prn hep            Self IASTM hep                         Y balance             Eccentric heel raises   2x10                       Ther Activity                                       Gait Training             Running form  5' midfoot strike pattern           Krysta  education  RN           Education on nature of condition   RN RN         Modalities

## 2024-08-02 ENCOUNTER — OFFICE VISIT (OUTPATIENT)
Dept: FAMILY MEDICINE CLINIC | Facility: CLINIC | Age: 19
End: 2024-08-02
Payer: COMMERCIAL

## 2024-08-02 VITALS
RESPIRATION RATE: 15 BRPM | SYSTOLIC BLOOD PRESSURE: 110 MMHG | HEART RATE: 77 BPM | WEIGHT: 149.6 LBS | TEMPERATURE: 97.7 F | BODY MASS INDEX: 20.94 KG/M2 | HEIGHT: 71 IN | OXYGEN SATURATION: 97 % | DIASTOLIC BLOOD PRESSURE: 56 MMHG

## 2024-08-02 DIAGNOSIS — Z00.00 ANNUAL PHYSICAL EXAM: Primary | ICD-10-CM

## 2024-08-02 PROCEDURE — 99395 PREV VISIT EST AGE 18-39: CPT | Performed by: FAMILY MEDICINE

## 2024-08-02 NOTE — PATIENT INSTRUCTIONS
"Patient Education     Routine physical for adults   The Basics   Written by the doctors and editors at [facility]   What is a physical? -- A physical is a routine visit, or \"check-up,\" with your doctor. You might also hear it called a \"wellness visit\" or \"preventive visit.\"  During each visit, the doctor will:   Ask about your physical and mental health   Ask about your habits, behaviors, and lifestyle   Do an exam   Give you vaccines if needed   Talk to you about any medicines you take   Give advice about your health   Answer your questions  Getting regular check-ups is an important part of taking care of your health. It can help your doctor find and treat any problems you have. But it's also important for preventing health problems.  A routine physical is different from a \"sick visit.\" A sick visit is when you see a doctor because of a health concern or problem. Since physicals are scheduled ahead of time, you can think about what you want to ask the doctor.  How often should I get a physical? -- It depends on your age and health. In general, for people age 21 years and older:   If you are younger than 50 years, you might be able to get a physical every 3 years.   If you are 50 years or older, your doctor might recommend a physical every year.  If you have an ongoing health condition, like diabetes or high blood pressure, your doctor will probably want to see you more often.  What happens during a physical? -- In general, each visit will include:   Physical exam - The doctor or nurse will check your height, weight, heart rate, and blood pressure. They will also look at your eyes and ears. They will ask about how you are feeling and whether you have any symptoms that bother you.   Medicines - It's a good idea to bring a list of all the medicines you take to each doctor visit. Your doctor will talk to you about your medicines and answer any questions. Tell them if you are having any side effects that bother you. You " "should also tell them if you are having trouble paying for any of your medicines.   Habits and behaviors - This includes:   Your diet   Your exercise habits   Whether you smoke, drink alcohol, or use drugs   Whether you are sexually active   Whether you feel safe at home  Your doctor will talk to you about things you can do to improve your health and lower your risk of health problems. They will also offer help and support. For example, if you want to quit smoking, they can give you advice and might prescribe medicines. If you want to improve your diet or get more physical activity, they can help you with this, too.   Lab tests, if needed - The tests you get will depend on your age and situation. For example, your doctor might want to check your:   Cholesterol   Blood sugar   Iron level   Vaccines - The recommended vaccines will depend on your age, health, and what vaccines you already had. Vaccines are very important because they can prevent certain serious or deadly infections.   Discussion of screening - \"Screening\" means checking for diseases or other health problems before they cause symptoms. Your doctor can recommend screening based on your age, risk, and preferences. This might include tests to check for:   Cancer, such as breast, prostate, cervical, ovarian, colorectal, prostate, lung, or skin cancer   Sexually transmitted infections, such as chlamydia and gonorrhea   Mental health conditions like depression and anxiety  Your doctor will talk to you about the different types of screening tests. They can help you decide which screenings to have. They can also explain what the results might mean.   Answering questions - The physical is a good time to ask the doctor or nurse questions about your health. If needed, they can refer you to other doctors or specialists, too.  Adults older than 65 years often need other care, too. As you get older, your doctor will talk to you about:   How to prevent falling at " home   Hearing or vision tests   Memory testing   How to take your medicines safely   Making sure that you have the help and support you need at home  All topics are updated as new evidence becomes available and our peer review process is complete.  This topic retrieved from [employer] on: May 02, 2024.  Topic 890584 Version 1.0  Release: 32.4.3 - C32.122  © 2024 UpToDate, Inc. and/or its affiliates. All rights reserved.  Consumer Information Use and Disclaimer   Disclaimer: This generalized information is a limited summary of diagnosis, treatment, and/or medication information. It is not meant to be comprehensive and should be used as a tool to help the user understand and/or assess potential diagnostic and treatment options. It does NOT include all information about conditions, treatments, medications, side effects, or risks that may apply to a specific patient. It is not intended to be medical advice or a substitute for the medical advice, diagnosis, or treatment of a health care provider based on the health care provider's examination and assessment of a patient's specific and unique circumstances. Patients must speak with a health care provider for complete information about their health, medical questions, and treatment options, including any risks or benefits regarding use of medications. This information does not endorse any treatments or medications as safe, effective, or approved for treating a specific patient. UpToDate, Inc. and its affiliates disclaim any warranty or liability relating to this information or the use thereof.The use of this information is governed by the Terms of Use, available at https://www.[employer]andas Groupuwer.com/en/know/clinical-effectiveness-terms. 2024© UpToDate, Inc. and its affiliates and/or licensors. All rights reserved.  Copyright   © 2024 UpToDate, Inc. and/or its affiliates. All rights reserved.

## 2024-08-02 NOTE — PROGRESS NOTES
Adult Annual Physical  Name: Amrit Dolan      : 2005      MRN: [id number]  Encounter Provider: Mary Gabriel DO  Encounter Date: 2024   Encounter department: [facility]    Assessment & Plan   1. Annual physical exam    Immunizations and preventive care screenings were discussed with patient today. Appropriate education was printed on patient's after visit summary.    Counseling:  Alcohol/drug use: discussed moderation in alcohol intake, the recommendations for healthy alcohol use, and avoidance of illicit drug use.  Dental Health: discussed importance of regular tooth brushing, flossing, and dental visits.  Injury prevention: discussed safety/seat belts, safety helmets, smoke detectors, carbon dioxide detectors, and smoking near bedding or upholstery.  Sexual health: discussed sexually transmitted diseases, partner selection, use of condoms, avoidance of unintended pregnancy, and contraceptive alternatives.  Exercise: the importance of regular exercise/physical activity was discussed. Recommend exercise 3-5 times per week for at least 30 minutes.          History of Present Illness     Adult Annual Physical:  Patient presents for annual physical.     Diet and Physical Activity:  - Diet/Nutrition: well balanced diet.  - Exercise: moderate cardiovascular exercise.    Depression Screening:  - PHQ-2 Score: 1    General Health:  - Sleep: sleeps well.  - Hearing: normal hearing bilateral ears.  - Vision: no vision problems.  - Dental: regular dental visits.    Review of Systems   Constitutional:  Negative for chills and fever.   HENT:  Negative for congestion, postnasal drip, rhinorrhea and sinus pain.    Eyes:  Negative for photophobia and visual disturbance.   Respiratory:  Negative for cough and shortness of breath.    Cardiovascular:  Negative for chest pain, palpitations and leg swelling.   Gastrointestinal:  Negative for abdominal pain, constipation,  "diarrhea, nausea and vomiting.   Genitourinary:  Negative for difficulty urinating and dysuria.   Musculoskeletal:  Negative for arthralgias and myalgias.   Skin:  Negative for rash.   Neurological:  Negative for dizziness and syncope.     Pertinent Medical History     Medical History Reviewed by provider this encounter:  Tobacco  Meds  Problems  Med Hx  Surg Hx  Fam Hx       Past Medical History   History reviewed. No pertinent past medical history.  History reviewed. No pertinent surgical history.  History reviewed. No pertinent family history.  Current Outpatient Medications on File Prior to Visit   Medication Sig Dispense Refill    tretinoin (RETIN-A) 0.025 % cream Apply topically daily at bedtime 45 g 1     No current facility-administered medications on file prior to visit.   Not on File   Current Outpatient Medications on File Prior to Visit   Medication Sig Dispense Refill    tretinoin (RETIN-A) 0.025 % cream Apply topically daily at bedtime 45 g 1     No current facility-administered medications on file prior to visit.      Social History     Tobacco Use    Smoking status: Never    Smokeless tobacco: Never   Vaping Use    Vaping status: Never Used   Substance and Sexual Activity    Alcohol use: Never    Drug use: Never    Sexual activity: Not on file       Objective     /56 (BP Location: Right arm, Patient Position: Sitting, Cuff Size: Standard)   Pulse 77   Temp 97.7 °F (36.5 °C) (Tympanic)   Resp 15   Ht 5' 10.7\" (1.796 m)   Wt 67.9 kg (149 lb 9.6 oz)   SpO2 97%   BMI 21.04 kg/m²     Physical Exam  Constitutional:       General: He is not in acute distress.     Appearance: Normal appearance. He is not ill-appearing, toxic-appearing or diaphoretic.   HENT:      Head: Normocephalic and atraumatic.      Right Ear: Tympanic membrane and ear canal normal.      Left Ear: Tympanic membrane and ear canal normal.      Nose: Nose normal. No congestion.      Mouth/Throat:      Mouth: Mucous " membranes are moist.      Pharynx: Oropharynx is clear. No oropharyngeal exudate.   Eyes:      Extraocular Movements: Extraocular movements intact.      Conjunctiva/sclera: Conjunctivae normal.      Pupils: Pupils are equal, round, and reactive to light.   Cardiovascular:      Rate and Rhythm: Normal rate and regular rhythm.      Pulses: Normal pulses.      Heart sounds: No murmur heard.  Pulmonary:      Effort: Pulmonary effort is normal.      Breath sounds: Normal breath sounds. No wheezing, rhonchi or rales.   Abdominal:      General: Bowel sounds are normal. There is no distension.      Palpations: Abdomen is soft.      Tenderness: There is no abdominal tenderness.   Musculoskeletal:         General: No swelling or tenderness. Normal range of motion.      Cervical back: Normal range of motion and neck supple.   Skin:     General: Skin is warm and dry.      Capillary Refill: Capillary refill takes less than 2 seconds.   Neurological:      General: No focal deficit present.      Mental Status: He is alert and oriented to person, place, and time.      Cranial Nerves: No cranial nerve deficit.   Psychiatric:         Mood and Affect: Mood normal.         Behavior: Behavior normal.         Thought Content: Thought content normal.

## 2024-08-12 ENCOUNTER — PATIENT MESSAGE (OUTPATIENT)
Dept: DERMATOLOGY | Facility: CLINIC | Age: 19
End: 2024-08-12

## 2024-08-12 DIAGNOSIS — L70.0 ACNE VULGARIS: ICD-10-CM

## 2024-08-13 RX ORDER — TRETINOIN 0.25 MG/G
CREAM TOPICAL
Qty: 45 G | Refills: 0 | Status: SHIPPED | OUTPATIENT
Start: 2024-08-13

## 2024-09-09 ENCOUNTER — TELEMEDICINE (OUTPATIENT)
Dept: DERMATOLOGY | Facility: CLINIC | Age: 19
End: 2024-09-09
Payer: COMMERCIAL

## 2024-09-09 DIAGNOSIS — L70.0 ACNE VULGARIS: Primary | ICD-10-CM

## 2024-09-09 PROCEDURE — 99214 OFFICE O/P EST MOD 30 MIN: CPT

## 2024-09-09 RX ORDER — TRETINOIN 0.25 MG/G
CREAM TOPICAL
Qty: 45 G | Refills: 0 | Status: SHIPPED | OUTPATIENT
Start: 2024-09-09

## 2024-09-09 RX ORDER — CLINDAMYCIN PHOSPHATE 10 UG/ML
LOTION TOPICAL DAILY
Qty: 60 ML | Refills: 3 | Status: SHIPPED | OUTPATIENT
Start: 2024-09-09

## 2024-09-09 NOTE — PROGRESS NOTES
"[facility] Dermatology Clinic Note     Patient Name: Amrit Dolan  Encounter Date: 09/09/24     Have you been cared for by a [facility] Dermatologist in the last 3 years and, if so, which description applies to you?    Yes.  I have been here within the last 3 years, and my medical history has NOT changed since that time.  I am MALE/not capable of bearing children.    REVIEW OF SYSTEMS:  Have you recently had or currently have any of the following? No changes in my recent health.   PAST MEDICAL HISTORY:  Have you personally ever had or currently have any of the following?  If \"YES,\" then please provide more detail. No changes in my medical history.   HISTORY OF IMMUNOSUPPRESSION: Do you have a history of any of the following:  Systemic Immunosuppression such as Diabetes, Biologic or Immunotherapy, Chemotherapy, Organ Transplantation, Bone Marrow Transplantation or Prednisone?  No     Answering \"YES\" requires the addition of the dotphrase \"IMMUNOSUPPRESSED\" as the first diagnosis of the patient's visit.   FAMILY HISTORY:  Any \"first degree relatives\" (parent, brother, sister, or child) with the following?    No changes in my family's known health.   PATIENT EXPERIENCE:    Do you want the Dermatologist to perform a COMPLETE skin exam today including a clinical examination under the \"bra and underwear\" areas?  NO  If necessary, do we have your permission to call and leave a detailed message on your Preferred Phone number that includes your specific medical information?  Yes      Not on File   Current Outpatient Medications:   •  clindamycin (CLEOCIN T) 1 % lotion, Apply topically in the morning To entire affected areas of face, Disp: 60 mL, Rfl: 3  •  tretinoin (RETIN-A) 0.025 % cream, Apply topically daily at bedtime, Disp: 45 g, Rfl: 0          Whom besides the patient is providing clinical information about today's encounter?   NO ADDITIONAL HISTORIAN (patient alone provided history)    Physical Exam and " "Assessment/Plan by Diagnosis:      Virtual Regular Visit  Name: Amrit Dolan      : 2005      MRN: [id number]  Encounter Provider: Codi Aguilar PA-C  Encounter Date: 2024   Encounter department: [facility] [place]    Verification of patient location:    Patient is located at [facility] in the following state in which I hold an active license [place]    Assessment & Plan   1. Acne vulgaris  -     clindamycin (CLEOCIN T) 1 % lotion; Apply topically in the morning To entire affected areas of face  -     tretinoin (RETIN-A) 0.025 % cream; Apply topically daily at bedtime        Encounter provider Codi Aguilar PA-C    The patient was identified by name and date of birth. Amrit Dolan was informed that this is a telemedicine visit and that the visit is being conducted through the Epic Embedded platform. He agrees to proceed..  My office door was closed. No one else was in the room.  He acknowledged consent and understanding of privacy and security of the video platform. The patient has agreed to participate and understands they can discontinue the visit at any time.    Patient is aware this is a billable service.     History of Present Illness   {Disappearing Hyperlinks I Encounters * My Last Note * Since Last Visit * History :29807}  Amrit Dolan is a 18 y.o. male who presents for acne follow up        Visit Time  Total Visit Duration: 15min    ACNE VULGARIS (\"COMMON ACNE\")    Physical Exam:  Psychiatric/Mood:  Anatomic Location Affected:  face-jawline  Morphological Description:  Open/Closed Comedones:  Few (\"Mild\")  Inflammatory Papules/Pustules:  Few (\"Mild\")  Nodules:  No evidence (\"Clear\")  Scarring:  No evidence (\"Clear\")  Excoriations:  No evidence (\"Clear\")  Local Skin Redness/Erythema:  No evidence (\"Clear\")  Local Skin Dryness/Scaling:  No evidence (\"Clear\")  Local Skin Dyspigmentation:  Few (\"Mild\")  Pertinent Positives:  Pertinent Negatives:    Additional " History of Present Condition:  Patient present for acne on the face, tx with tretinoin, note much better  but does still get a little bit of acne along the jawline/beard area.    Assessment and Plan:  We reviewed the causes of acne, the “kinds” of acne, and the expected clinical course.  We discussed treatment options ranging from over-the-counter products, topical retinoids, antibiotics, BP, hormonal therapies (OCPs/spironolactone), and isotretinoin (Accutane).  We reviewed specific over-the-counter interventions and medications. Recommended typical hygiene measures including water-based facial products, washing regularly with mild cleanser, and refraining from picking and popping any pimples.   Recommended non-comedogenic sunscreen use daily.   Expectations of therapy discussed. Side effects, risks and benefits of medications discussed.  A comprehensive handout on Acne was provided.  The phone number to call in case of questions or concerns (and instructions to stop medications in such a scenario) was provided.  After lengthy discussion of etiology and treatment options, we decided to implement the following personalized treatment plan:    Based on a thorough discussion of this condition and the management approach to it (including a comprehensive discussion of the known risks, side effects and potential benefits of treatment), the patient (family) agrees to implement the following specific plan:    --------------------------------------------------------------------------------------  YOUR PERSONALIZED ACNE ACTION PLAN    “MORNING ROUTINE”    SKIN HYGIENE:  In the shower, wash your face, chest and back gently with Cetaphil moisturizing cleanser or Dove Fragrance-free bar.  Do not use a luffa or washcloth as these tend to be too irritating to acne-prone skin.    ANTIMICROBIAL BENZOYL PEROXIDE:  None  Neutrogena Clear Pore (Benzoyl Peroxide 3% wash):  In the shower, apply this medication to your face, chest and  back. Leave this wash on your skin for about 5 minutes and then rinse it off completely while in the shower. If you do not rinse it off completely, then it will bleach your towels or clothing.  This medication is now available without prescription (over-the-counter) in most drug stores or at [employer] for about $7 a bottle.         PenOxyl wash: Apply on face leave in for 1-2 minutes and completely rinse off    Sulfacetamide sodium-sulfur wash: Apply to face daily  You may use any brand of benzoyl peroxide 10% wash over the counter    ANTIBIOTICS:    Topical Clindamycin 1% lotion after face wash    “EVENING ROUTINE”    SKIN HYGIENE:  In the shower, wash your face, chest and back gently with Cetaphil moisturizing cleanser or Dove Fragrance-free bar.  Do not use a lufa or washcloth as these tend to be too irritating to acne-prone skin.    TOPICAL RETINOID:  At 1 hour before bedtime (after washing your face and allowing the skin to completely dry), spread only a single pea-sized amount of this medication evenly over your entire face (avoiding your eyes or mouth):  Tretinoin 0.025%  cream one hour before bedtime    WHEN AND WHERE TO CALL WITH CONCERNS  We are here to help!  If you experience any unusual symptoms, then stop taking or using the medication and call our office at [phone] (SKIN).  It is better to be safe than to be sorry!     Follow up in 3months, okay if virtual visit needed while in school  Scribe Attestation    I,:  Joon Roberts MA am acting as a scribe while in the presence of the attending physician.:       I,:  Codi Aguilar PA-C personally performed the services described in this documentation    as scribed in my presence.:

## 2024-12-17 ENCOUNTER — TELEMEDICINE (OUTPATIENT)
Dept: DERMATOLOGY | Facility: CLINIC | Age: 19
End: 2024-12-17
Payer: COMMERCIAL

## 2024-12-17 DIAGNOSIS — L70.0 ACNE VULGARIS: Primary | ICD-10-CM

## 2024-12-17 PROCEDURE — 99214 OFFICE O/P EST MOD 30 MIN: CPT

## 2024-12-17 RX ORDER — DOXYCYCLINE 100 MG/1
100 CAPSULE ORAL EVERY 12 HOURS SCHEDULED
Qty: 60 CAPSULE | Refills: 2 | Status: SHIPPED | OUTPATIENT
Start: 2024-12-17 | End: 2025-03-17

## 2024-12-17 RX ORDER — CLINDAMYCIN PHOSPHATE 10 UG/ML
LOTION TOPICAL DAILY
Qty: 60 ML | Refills: 3 | Status: SHIPPED | OUTPATIENT
Start: 2024-12-17

## 2024-12-17 RX ORDER — TRETINOIN 0.25 MG/G
CREAM TOPICAL
Qty: 45 G | Refills: 3 | Status: SHIPPED | OUTPATIENT
Start: 2024-12-17

## 2024-12-17 NOTE — PROGRESS NOTES
"[facility] Dermatology Clinic Note     Patient Name: Amrit Dolan  Encounter Date: 12/17/24     Have you been cared for by a [facility] Dermatologist in the last 3 years and, if so, which description applies to you?    Yes.  I have been here within the last 3 years, and my medical history has NOT changed since that time.  I am MALE/not capable of bearing children.    REVIEW OF SYSTEMS:  Have you recently had or currently have any of the following? No changes in my recent health.   PAST MEDICAL HISTORY:  Have you personally ever had or currently have any of the following?  If \"YES,\" then please provide more detail. No changes in my medical history.   HISTORY OF IMMUNOSUPPRESSION: Do you have a history of any of the following:  Systemic Immunosuppression such as Diabetes, Biologic or Immunotherapy, Chemotherapy, Organ Transplantation, Bone Marrow Transplantation or Prednisone?  No     Answering \"YES\" requires the addition of the dotphrase \"IMMUNOSUPPRESSED\" as the first diagnosis of the patient's visit.   FAMILY HISTORY:  Any \"first degree relatives\" (parent, brother, sister, or child) with the following?    No changes in my family's known health.   PATIENT EXPERIENCE:    Do you want the Dermatologist to perform a COMPLETE skin exam today including a clinical examination under the \"bra and underwear\" areas?  NO  If necessary, do we have your permission to call and leave a detailed message on your Preferred Phone number that includes your specific medical information?  Yes      Not on File   Current Outpatient Medications:   •  clindamycin (CLEOCIN T) 1 % lotion, Apply topically in the morning To face and affected areas of back., Disp: 60 mL, Rfl: 3  •  doxycycline hyclate (VIBRAMYCIN) 100 mg capsule, Take 1 capsule (100 mg total) by mouth every 12 (twelve) hours With a glass of water and food., Disp: 60 capsule, Rfl: 2  •  tretinoin (RETIN-A) 0.025 % cream, Apply topically daily at bedtime Spread a pea sized " "amount over the entire face., Disp: 45 g, Rfl: 3          Whom besides the patient is providing clinical information about today's encounter?   NO ADDITIONAL HISTORIAN (patient alone provided history)    Physical Exam and Assessment/Plan by Diagnosis:      Virtual Regular Visit  Name: Amrit Dolan      : 2005      MRN: [id number]  Encounter Provider: Codi Aguilar PA-C  Encounter Date: 2024   Encounter department: [facility] DERMATOLOGY [place]    Verification of patient location:    Patient is located at [facility] in the following [facility] in which I hold an active license [place]    Assessment & Plan   1. Acne vulgaris  -     clindamycin (CLEOCIN T) 1 % lotion; Apply topically in the morning To face and affected areas of back.  -     tretinoin (RETIN-A) 0.025 % cream; Apply topically daily at bedtime Spread a pea sized amount over the entire face.  -     doxycycline hyclate (VIBRAMYCIN) 100 mg capsule; Take 1 capsule (100 mg total) by mouth every 12 (twelve) hours With a glass of water and food.          Encounter provider Codi Aguilar PA-C    The patient was identified by name and date of birth. Amrit Dolan was informed that this is a telemedicine visit and that the visit is being conducted through the Epic Embedded platform. He agrees to proceed..  My office door was closed. No one else was in the room.  He acknowledged consent and understanding of privacy and security of the video platform. The patient has agreed to participate and understands they can discontinue the visit at any time.    Patient is aware this is a billable service.     History of Present Illness     Amrit Dolan is a 19 y.o. male who presents for 3mo acne follow up    Visit Time  Total Visit Duration: 15min    ACNE VULGARIS (\"COMMON ACNE\")    Physical Exam:  Psychiatric/Mood:  Anatomic Location Affected:  face-jawline, back   Morphological Description:  Open/Closed Comedones:  Few " "(\"Mild\")  Inflammatory Papules/Pustules:  Few (\"Mild\")  Nodules:  Few (\"Mild\")  Scarring:  Several (\"Moderate\")-difficult to assess via virtual visit  Excoriations:  No evidence (\"Clear\")  Local Skin Redness/Erythema:  No evidence (\"Clear\")  Local Skin Dryness/Scaling:  No evidence (\"Clear\")  Local Skin Dyspigmentation:  Few (\"Mild\")  Pertinent Positives:  Pertinent Negatives:    Additional History of Present Condition:  Patient present for acne on the face and now on the back. Using clindamycin lotion in Am and tretinoin 0.025% cream in PM. Washing with cetaphil. Patient reports acne along beard area still present and now getting acne on back too. No noted improvement since last visit.     Assessment and Plan:  We reviewed the causes of acne, the “kinds” of acne, and the expected clinical course.  We discussed treatment options ranging from over-the-counter products, topical retinoids, antibiotics, and isotretinoin (Accutane).  We reviewed specific over-the-counter interventions and medications. Recommended typical hygiene measures including water-based facial products, washing regularly with mild cleanser, and refraining from picking and popping any pimples.   Recommended non-comedogenic sunscreen use daily.   Expectations of therapy discussed. Side effects, risks and benefits of medications discussed.  A comprehensive handout on Acne was provided.  The phone number to call in case of questions or concerns (and instructions to stop medications in such a scenario) was provided.  After lengthy discussion of etiology and treatment options, we decided to implement the following personalized treatment plan:    Based on a thorough discussion of this condition and the management approach to it (including a comprehensive discussion of the known risks, side effects and potential benefits of treatment), the patient (family) agrees to implement the following specific " plan:    --------------------------------------------------------------------------------------  YOUR PERSONALIZED ACNE ACTION PLAN    “MORNING ROUTINE”    In the shower, wash your face, chest and back gently with CeraVe or other Benzoyl Peroxide wash. Let sit on skin for a couple of minutes, then thoroughly rinse off.  If you do not rinse it off completely, then it will bleach your towels or clothing. Do not use a luffa or washcloth as these tend to be too irritating to acne-prone skin.    ANTIBIOTICS:    Topical Clindamycin 1% lotion after face wash  Oral doxycycline 100mg capsule to be taken with a full glass of water and something to eat. Patient cautioned on potential side effect of stomach upset and increased photosensitivity.     “EVENING ROUTINE”    ANTIBIOTICS:  Oral doxycycline 100mg capsule to be taken with a full glass of water and something to eat. Patient cautioned on potential side effect of stomach upset and increased photosensitivity.   SKIN HYGIENE:  In the shower, wash your face, chest and back gently with Cetaphil moisturizing cleanser. Do not use a lufa or washcloth as these tend to be too irritating to acne-prone skin.    TOPICAL RETINOID:  At 1 hour before bedtime (after washing your face and allowing the skin to completely dry), spread only a single pea-sized amount of this medication evenly over your entire face (avoiding your eyes or mouth):  Tretinoin 0.025%  cream one hour before bedtime    We discussed possibility of initiating accutane therapy at next in person visit depending on how patient responds to addition of doxycycline. Has had ongoing acne for years and not improving with consistent, daily use of topicals. Patient aware of potential side effects including sun sensitivity, dry lips/eyes/skin, headaches, blurry vision (pseudotumor cerebri), muscle/joint aches, GI upset, bloody stools (“IBD” including Crohn’s/Ulcerative Colitis), jaundice, liver dysfunction, lipid abnormalities,  bone marrow dysfunction, mood effects, thoughts of hurting oneself or others, and flaring of acne. Patient aware of need for baseline and routine labs throughout course of accutane and need for monthly visits.     WHEN AND WHERE TO CALL WITH CONCERNS  We are here to help!  If you experience any unusual symptoms, then stop taking or using the medication and call our office at [phone] (SKIN).  It is better to be safe than to be sorry!     Follow up in 3months in office to evaluate skin and consider initiation of accutane therapy.     Codi Aguilar PA-C

## 2024-12-31 ENCOUNTER — OFFICE VISIT (OUTPATIENT)
Dept: PHYSICAL THERAPY | Facility: CLINIC | Age: 19
End: 2024-12-31
Payer: COMMERCIAL

## 2024-12-31 DIAGNOSIS — S86.002D INJURY OF LEFT ACHILLES TENDON, SUBSEQUENT ENCOUNTER: Primary | ICD-10-CM

## 2024-12-31 PROCEDURE — 97161 PT EVAL LOW COMPLEX 20 MIN: CPT | Performed by: PHYSICAL THERAPIST

## 2024-12-31 PROCEDURE — 97140 MANUAL THERAPY 1/> REGIONS: CPT | Performed by: PHYSICAL THERAPIST

## 2024-12-31 NOTE — PROGRESS NOTES
PT Evaluation     Today's date: 2024  Patient name: Amrit Dolan  : 2005  MRN: [id number]  Referring provider: Mary Gabriel DO  Dx:   Encounter Diagnosis     ICD-10-CM    1. Injury of left Achilles tendon, subsequent encounter  S86.002D                        Assessment  Impairments: abnormal muscle firing, abnormal or restricted ROM, activity intolerance, lacks appropriate home exercise program and pain with function    Assessment details: Amrit Dolan is a 18 y.o. male presenting to physical therapy with chronic left calf/achilles pain.  He is likely dealing with achilles tendinopathy due to large amounts of running and slightly gastroc-soleus tightness. He has experienced an acute on chronic flare as his season was winding down, eclipsing 100 miles of running per week. Patient presents with pain, decreased calf extensibility and range of motion, tenderness to palpation and decreased tolerance to activity. Due to these impairments patient has difficulty performing sport related activities and recreational activities.  Barriers to therapy: Patient returns to school this weekend    Goals    Impairment Goals:  1.) Pt will have decreased sx at rest to 0/10 in 2-3 weeks.  2.) Pt will have improved ankle strength by 1/2 MMT in 3-4 weeks.  3.) Pt will have decreased tenderness to palpation to soleus by 50% in 1-2 weeks.  4.) Pt will have improved ankle range of motion to WNL as compared B in 4-6 weeks.    Functional Goals:  1.) Pt will be independent in their home exercise program in 1 week.  2.) Pt will be able to run on flat surfaces without difficulty or pain in 2-4 weeks.  3.) Pt will be able to resume all ADL's without calf pain in 1-2 weeks.  4.) Pt will have an improved FOTO score of 100/100 in 2-4 weeks.    Plan  Patient would benefit from: skilled physical therapy    Planned therapy interventions: home exercise program, therapeutic exercise, strengthening, stretching,  "patient education, behavior modification, manual therapy, work reintegration, gait training, neuromuscular re-education, graded activity and graded exercise    Frequency: 1x week  Plan of Care beginning date: 2024  Plan of Care expiration date: 2025        Subjective Evaluation    History of Present Illness  Mechanism of injury: Scot is a 19 year old male presenting to physical therapy with left calf/achilles pain. He explains that things went well into his season but have seemed to flare up again. At the end of his season he was getting upwards of 100 miles per week. His pain feels very similar to his discomfort last time, he has fallen off his self treatment a bit. He notes that when he is on his feet for long periods of time, like working at [employer] for 12 hours, his achilles starts to bother him at the insertion point. He has very little discomfort first thing in the morning.     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 At his peak he puts about 85 miles of running in per week. He runs in New Balance and Chris shoes, alternative wearing them, and gets new pairs 4x/year.     He reports that this pain has been going on for a while but this time it did not resolve during his off weeks. He has a two week break since its the end of the season but is hopeful to get back into his training routine starting next week, depending on his pain.      About 4 weeks ago his calf pain pressure and tightness started up again, no prior injuries related to this. He went to the  and was dx with mild achilles tendinitis. He explains that they \"scraped\" him and had him doing ice baths and stretches, this seemed to help. He denies pain on the right side.     Eases: ice, \"scraping\", rest, stretching  Patient Goals  Patient goals for therapy: decreased pain and return to sport/leisure activities    Pain  Current pain ratin  At best pain ratin  At worst pain " ratin  Quality: sharp and tight  Relieving factors: change in position and ice  Aggravating factors: running  Progression: no change        Objective     Palpation   Left   No palpable tenderness to the lateral gastrocnemius.   Tenderness of the medial gastrocnemius and soleus.     Tenderness   Left Ankle/Foot   Tenderness in the Achilles insertion. No tenderness in the plantar fascia.     Active Range of Motion   Left Ankle/Foot   Dorsiflexion (ke): 6 degrees   Dorsiflexion (kf): 12 degrees   Plantar flexion: WFL    Right Ankle/Foot   Dorsiflexion (ke): 10 degrees   Dorsiflexion (kf): 18 degrees   Plantar flexion: WFL    Strength/Myotome Testing     Left Ankle/Foot   Dorsiflexion: 5  Plantar flexion: 5 (Slight achilles pain)  Inversion: 5  Eversion: 5    Right Ankle/Foot   Dorsiflexion: 5  Plantar flexion: 5  Inversion: 5  Eversion: 5    Tests     Left Hip   Negative Ely's.     Right Hip   Negative Ely's.          Precautions: None        Manuals              IASTM L Gastroc/Soleus RN RN RN RN  RN             Tiger tail calf RN RN RN RN  FAIZAN Box RN                                     Neuro Re-Ed                                                                                                                                                                                               Ther Ex                       Standing gastroc/soleus s' 5x15'' ea Pro stretch 3x30'' Pro stretch 3x30'' Pro stretch 3x30'' Pro stretch 3x30''              Foam rolling calf hep     5'              Ice prn hep                     Self IASTM hep                                             Y balance                       Eccentric heel raises     2x10                                         Ther Activity                                                                       Gait Training                       Running form   5' midfoot strike pattern                   Krysta education    RN                   Education on nature of condition     RN RN               Modalities

## 2025-01-03 ENCOUNTER — OFFICE VISIT (OUTPATIENT)
Dept: PHYSICAL THERAPY | Facility: CLINIC | Age: 20
End: 2025-01-03
Payer: COMMERCIAL

## 2025-01-03 DIAGNOSIS — S86.002D INJURY OF LEFT ACHILLES TENDON, SUBSEQUENT ENCOUNTER: Primary | ICD-10-CM

## 2025-01-03 PROCEDURE — 97110 THERAPEUTIC EXERCISES: CPT | Performed by: PHYSICAL THERAPIST

## 2025-01-03 PROCEDURE — 97140 MANUAL THERAPY 1/> REGIONS: CPT | Performed by: PHYSICAL THERAPIST

## 2025-01-03 NOTE — PROGRESS NOTES
Daily Note     Today's date: 1/3/2025  Patient name: Amrit Dolan  : 2005  MRN: [id number]  Referring provider: Mary Gabriel DO  Dx:   Encounter Diagnosis     ICD-10-CM    1. Injury of left Achilles tendon, subsequent encounter  S86.002D                      Subjective: Scot explains that he has biked about 1 hour each day for the past 3 days, this does not increase his pain. His pain was reduced for a few hours after last session, this gradually came back while he was working later that day. He leaves to go back to school this weekend and plans to have treatment form their on site PT when he is at school.       Objective: See treatment diary below      Assessment: Tolerated treatment well. Patient demonstrated fatigue post treatment and exhibited good technique with therapeutic exercises. Pt educated on importance of continuing HEP, progressions of exercises and to contact the facility if there are any questions or concerns in the future. Pt will be discharged from PT at this time.        Plan:  DC as pt is returning to school     Precautions: None        Manuals 5/21 5/28 6/5 6/18  12/31  1/3           IASTM L Gastroc/Soleus RN RN RN RN  RN  RN           Tiger tail calf RN RN RN RN  RN  RN           Theragun          RN  RN                                   Neuro Re-Ed                                                                                                                                                                                               Ther Ex                       Standing gastroc/soleus s' 5x15'' ea Pro stretch 3x30'' Pro stretch 3x30'' Pro stretch 3x30'' Pro stretch 3x30''  Pro stretch 3x30''            Foam rolling calf hep     5'              Ice prn hep                     Self IASTM hep                                             Y balance                       Eccentric heel raises     2x10                                         Ther Activity                                                                        Gait Training                       Running form   5' midfoot strike pattern                   Krysta education   RN                   Education on nature of condition     RN RN               Modalities

## 2025-03-03 ENCOUNTER — OFFICE VISIT (OUTPATIENT)
Dept: DERMATOLOGY | Facility: CLINIC | Age: 20
End: 2025-03-03
Payer: COMMERCIAL

## 2025-03-03 VITALS — BODY MASS INDEX: 22.31 KG/M2 | WEIGHT: 158.6 LBS

## 2025-03-03 DIAGNOSIS — L70.0 ACNE VULGARIS: Primary | ICD-10-CM

## 2025-03-03 DIAGNOSIS — Z79.899 ON ISOTRETINOIN THERAPY: ICD-10-CM

## 2025-03-03 PROCEDURE — 99214 OFFICE O/P EST MOD 30 MIN: CPT

## 2025-03-03 NOTE — PROGRESS NOTES
"[facility] Note     Patient Name: Amrit Dolan  Encounter Date: 3/3/25     Have you been cared for by a [facility] Dermatologist in the last 3 years and, if so, which description applies to you?    Yes.  I have been here within the last 3 years, and my medical history has NOT changed since that time.  I am MALE/not capable of bearing children.    REVIEW OF SYSTEMS:  Have you recently had or currently have any of the following? No changes in my recent health.   PAST MEDICAL HISTORY:  Have you personally ever had or currently have any of the following?  If \"YES,\" then please provide more detail. No changes in my medical history.   HISTORY OF IMMUNOSUPPRESSION: Do you have a history of any of the following:  Systemic Immunosuppression such as Diabetes, Biologic or Immunotherapy, Chemotherapy, Organ Transplantation, Bone Marrow Transplantation or Prednisone?  No     Answering \"YES\" requires the addition of the dotphrase \"IMMUNOSUPPRESSED\" as the first diagnosis of the patient's visit.   FAMILY HISTORY:  Any \"first degree relatives\" (parent, brother, sister, or child) with the following?    No changes in my family's known health.   PATIENT EXPERIENCE:    Do you want the Dermatologist to perform a COMPLETE skin exam today including a clinical examination under the \"bra and underwear\" areas?  NO  If necessary, do we have your permission to call and leave a detailed message on your Preferred Phone number that includes your specific medical information?  Yes      No Known Allergies   Current Outpatient Medications:   •  clindamycin (CLEOCIN T) 1 % lotion, Apply topically in the morning To face and affected areas of back., Disp: 60 mL, Rfl: 3  •  doxycycline hyclate (VIBRAMYCIN) 100 mg capsule, Take 1 capsule (100 mg total) by mouth every 12 (twelve) hours With a glass of water and food., Disp: 60 capsule, Rfl: 2  •  tretinoin (RETIN-A) 0.025 % cream, Apply topically daily at bedtime Spread a pea " "sized amount over the entire face., Disp: 45 g, Rfl: 3          Whom besides the patient is providing clinical information about today's encounter?   NO ADDITIONAL HISTORIAN (patient alone provided history)    Physical Exam and Assessment/Plan by Diagnosis:    ISOTRETINOIN INITIATION  ACNE VULGARIS (\"COMMON ACNE\")    Physical Exam:  Anatomic Location Affected:   Face, chest, back  Morphological Description: Open and closed comedones, erythematous papules, pustules, nodules, erythema   Pertinent Positives:  Pertinent Negatives:    WEIGHT: 71.9 kg    Additional History of Present Condition:       Treatments previously attempted: Previously has been on topical antibiotics, doxycycline, tretinoin without improvement and has significant scarring on face and some on upper back. Patient is a student at [employer] and will most likely do virtual visits for follow up and can come to office in person when on home during breaks        LABS  Baseline labs ordered today (CBC, CMP, Lipid panel)  Repeat labs to be performed after achieving peak dose (AST/ALT, Triglycerides)  Additional laboratory testing is not warranted when these tests are WNL and no additional testing is recommended based on recent guidelines ([facility], Isotretinoin Laboratory Monitoring in Acne Treatment: A [place] Consensus Study). Additional studies have shown that most lab abnormalities in isotretinoin users arise by month two and this laboratory monitoring serves to identify rare cases of lab abnormalities that warrant cessation or reduction in treatment (PMID: 85559184)    DIAGNOSES:    Acne Vulgaris  High Risk Medication - STARTING Accutane therapy   Xerosis (see below for patient education)    Assessment and Plan:    Acne vulgaris  Previously failed numerous topical/oral medications-wash with a gentle cleanser only and apply moisturizer and SPF daily. Do not use prescription topicals or oral antibiotic while on accutane.   Patient registered " in ipledge. Consent signed. Labs ordered-will plan for initiation of 30mg daily pending lab work. Patient aware these are fasting labs  Discussed treatment options at length, including topical antibiotics, topical retinoids, oral antibiotics at length, including risks and benefits  Patient prefers to initiate process for isotretinoin today  Information on isotretinoin provided to patient  Consent signed  Patient registered for iPledge  Preferred email: [email or web address]  Preferred method of communication: text  Ipledge number: [phone]  Patient educated that iPledge will mail or email information with username and password (depending on preferred method of communication) and that patient must log in and do a quiz every month starting the day the medication is ordered. Without this quiz the pharmacy WILL NOT release the medication.  Baseline labs ordered as above. Educated will need repeat blood work at least once during the course of treatment (possibly more should any baseline irregularities result on initial labwork) and monthly dermatology visits are required.  Dose on initiation (MG): 30 mg pending lab results  GOAL DOSE (160 MG/KG):11,504 mg    Patients counseled:  Cannot donate blood while taking isotretinoin and for 1 month after completing therapy. YES  Do not share medication with anyone. YES  Possible side effects discussed, including sun sensitivity, dry lips/eyes/skin, headaches, blurry vision (pseudotumor cerebri), muscle/joint aches, GI upset, bloody stools (“IBD” including Crohn’s/Ulcerative Colitis), jaundice, liver dysfunction, lipid abnormalities, bone marrow dysfunction, mood effects, thoughts of hurting oneself or others, and flaring of acne (acne fulminans/SAPPHO). YES  Report any side effects of mood swings or depression and stop medication upon occurrence. YES  Patient needs to confirm counseling in iPledge prior to picking up prescription. YES    Scribe Attestation    I,:  [place]  Andrew am acting as a scribe while in the presence of the attending physician.:       I,:  Codi Aguilar PA-C personally performed the services described in this documentation    as scribed in my presence.:

## 2025-03-04 ENCOUNTER — OFFICE VISIT (OUTPATIENT)
Dept: PHYSICAL THERAPY | Facility: CLINIC | Age: 20
End: 2025-03-04
Payer: COMMERCIAL

## 2025-03-04 DIAGNOSIS — M76.60 INSERTIONAL ACHILLES TENDINOPATHY: Primary | ICD-10-CM

## 2025-03-04 PROCEDURE — 97161 PT EVAL LOW COMPLEX 20 MIN: CPT | Performed by: PHYSICAL THERAPIST

## 2025-03-04 PROCEDURE — 97110 THERAPEUTIC EXERCISES: CPT | Performed by: PHYSICAL THERAPIST

## 2025-03-04 NOTE — PROGRESS NOTES
PT Evaluation     Today's date: 3/4/2025  Patient name: Amrit Dolan  : 2005  MRN: [id number]  Referring provider: Mary Gabriel DO  Dx:   Encounter Diagnosis     ICD-10-CM    1. Insertional Achilles tendinopathy  M76.60                          Assessment  Impairments: abnormal muscle firing, abnormal or restricted ROM, activity intolerance, lacks appropriate home exercise program and pain with function    Assessment details: Amrit Dolan is a 18 y.o. male presenting to physical therapy with chronic left calf/achilles tedinopathy.  He is likely dealing with achilles tendinopathy due to large amounts of running and slight gastroc-soleus tightness. Added in dynamic stability and balance this session with BOSU balance in single leg and squats as well. He has experienced an acute on chronic flare as his season was winding down, eclipsing 100 miles of running per week. Patient presents with pain, decreased calf extensibility and range of motion, tenderness to palpation and decreased tolerance to activity. Due to these impairments patient has difficulty performing sport related activities and recreational activities.  Barriers to therapy: Patient returns to school this weekend    Goals    Impairment Goals:  1.) Pt will have decreased sx at rest to 0/10 in 2-3 weeks.  2.) Pt will have improved ankle strength by 1/2 MMT in 3-4 weeks.  3.) Pt will have decreased tenderness to palpation to soleus by 50% in 1-2 weeks.  4.) Pt will have improved ankle range of motion to WNL as compared B in 4-6 weeks.    Functional Goals:  1.) Pt will be independent in their home exercise program in 1 week.  2.) Pt will be able to run on flat surfaces without difficulty or pain in 2-4 weeks.  3.) Pt will be able to resume all ADL's without calf pain in 1-2 weeks.  4.) Pt will have an improved FOTO score of 100/100 in 2-4 weeks.    Plan  Patient would benefit from: skilled physical therapy    Planned therapy  "interventions: home exercise program, therapeutic exercise, strengthening, stretching, patient education, behavior modification, manual therapy, work reintegration, gait training, neuromuscular re-education, graded activity and graded exercise    Frequency: 1x week  Plan of Care beginning date: 3/4/2025  Plan of Care expiration date: 3/25/2025      Subjective Evaluation    History of Present Illness  Mechanism of injury: Scot is a 19 year old male presenting to physical therapy with ongoing left calf/achilles pain. He explains that he was ramping back up into running, getting up to 8 miles/day. He also explains that he was working with the trainers at school and they added in jump training, this seemed to flare things up. He notes that he saw a provider that gave him a medicated patch and put him into a boot temporarily while they ruled out a stress fracture, this came back negative. He has continued to cross train, biking and swimming have been a non issue for his pain levels. He has continued to do his self treatment; scraping, massaging and stretching as much as possible.  He has about a month to go before [facility].    Prior Evaluation: He explains that things went well into his season but have seemed to flare up again. At the end of his season he was getting upwards of 100 miles per week. His pain feels very similar to his discomfort last time, he has fallen off his self treatment a bit. He notes that when he is on his feet for long periods of time, like working at [employer] for 12 hours, his achilles starts to bother him at the insertion point. He has very little discomfort first thing in the morning.     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 At his peak he puts about 85 miles of running in per week. He runs in New Balance and [employer] shoes, alternative wearing them, and gets new pairs 4x/year.     Eases: ice, \"scraping\", rest, stretching  Patient Goals  Patient goals for " therapy: decreased pain, return to sport/leisure activities and increased motion    Pain  Current pain ratin  At best pain ratin  At worst pain ratin  Quality: sharp and tight  Relieving factors: change in position and ice  Aggravating factors: running  Progression: no change      Objective     Palpation   Left   No palpable tenderness to the lateral gastrocnemius.   Tenderness of the medial gastrocnemius and soleus.     Tenderness   Left Ankle/Foot   Tenderness in the Achilles insertion. No tenderness in the plantar fascia.     Active Range of Motion   Left Ankle/Foot   Dorsiflexion (ke): 6 degrees   Dorsiflexion (kf): 12 degrees   Plantar flexion: WFL    Right Ankle/Foot   Dorsiflexion (ke): 10 degrees   Dorsiflexion (kf): 18 degrees   Plantar flexion: WFL    Strength/Myotome Testing     Left Ankle/Foot   Dorsiflexion: 5  Plantar flexion: 5 (Slight achilles pain)  Inversion: 5  Eversion: 5    Right Ankle/Foot   Dorsiflexion: 5  Plantar flexion: 5  Inversion: 5  Eversion: 5    Tests     Left Hip   Negative Ely's.     Right Hip   Negative Ely's.          Precautions: None        Manuals 5/21 5/28 6/5 6/18  12/31  3/4           IASTM L Gastroc/Soleus RN RN RN RN  RN  RN           Tiger tail calf RN RN RN RN  RN  RN           Theragun          RN  RN                                   Neuro Re-Ed                        SL BOSU balance            5x15''            BOSU squats           2x10                                                                                                                                   Ther Ex                       Standing gastroc/soleus s' 5x15'' ea Pro stretch 3x30'' Pro stretch 3x30'' Pro stretch 3x30'' Pro stretch 3x30''  Pro stretch 3x30''             Foam rolling calf hep     5'              Ice prn hep                     Self IASTM hep                                            Y balance                       Eccentric heel raises     2x10      3x10 lvl 1                                    Ther Activity                                                                       Gait Training                       Running form   5' midfoot strike pattern                   Krysta education   RN                   Education on nature of condition     RN RN               Modalities

## 2025-03-06 ENCOUNTER — APPOINTMENT (OUTPATIENT)
Dept: LAB | Facility: CLINIC | Age: 20
End: 2025-03-06
Payer: COMMERCIAL

## 2025-03-06 ENCOUNTER — OFFICE VISIT (OUTPATIENT)
Dept: PHYSICAL THERAPY | Facility: CLINIC | Age: 20
End: 2025-03-06
Payer: COMMERCIAL

## 2025-03-06 DIAGNOSIS — Z79.899 ON ISOTRETINOIN THERAPY: ICD-10-CM

## 2025-03-06 DIAGNOSIS — L70.0 ACNE VULGARIS: ICD-10-CM

## 2025-03-06 DIAGNOSIS — M76.60 INSERTIONAL ACHILLES TENDINOPATHY: Primary | ICD-10-CM

## 2025-03-06 LAB
ALBUMIN SERPL BCG-MCNC: 4.4 G/DL (ref 3.5–5)
ALP SERPL-CCNC: 103 U/L (ref 34–104)
ALT SERPL W P-5'-P-CCNC: 20 U/L (ref 7–52)
ANION GAP SERPL CALCULATED.3IONS-SCNC: 8 MMOL/L (ref 4–13)
AST SERPL W P-5'-P-CCNC: 21 U/L (ref 13–39)
BASOPHILS # BLD AUTO: 0.03 THOUSANDS/ÂΜL (ref 0–0.1)
BASOPHILS NFR BLD AUTO: 1 % (ref 0–1)
BILIRUB SERPL-MCNC: 0.59 MG/DL (ref 0.2–1)
BUN SERPL-MCNC: 20 MG/DL (ref 5–25)
CALCIUM SERPL-MCNC: 9.9 MG/DL (ref 8.4–10.2)
CHLORIDE SERPL-SCNC: 101 MMOL/L (ref 96–108)
CHOLEST SERPL-MCNC: 136 MG/DL (ref ?–200)
CO2 SERPL-SCNC: 30 MMOL/L (ref 21–32)
CREAT SERPL-MCNC: 0.76 MG/DL (ref 0.6–1.3)
EOSINOPHIL # BLD AUTO: 0.1 THOUSAND/ÂΜL (ref 0–0.61)
EOSINOPHIL NFR BLD AUTO: 2 % (ref 0–6)
ERYTHROCYTE [DISTWIDTH] IN BLOOD BY AUTOMATED COUNT: 11.9 % (ref 11.6–15.1)
GFR SERPL CREATININE-BSD FRML MDRD: 130 ML/MIN/1.73SQ M
GLUCOSE P FAST SERPL-MCNC: 91 MG/DL (ref 65–99)
HCT VFR BLD AUTO: 43.6 % (ref 36.5–49.3)
HDLC SERPL-MCNC: 51 MG/DL
HGB BLD-MCNC: 14 G/DL (ref 12–17)
IMM GRANULOCYTES # BLD AUTO: 0.01 THOUSAND/UL (ref 0–0.2)
IMM GRANULOCYTES NFR BLD AUTO: 0 % (ref 0–2)
LDLC SERPL CALC-MCNC: 70 MG/DL (ref 0–100)
LYMPHOCYTES # BLD AUTO: 1.57 THOUSANDS/ÂΜL (ref 0.6–4.47)
LYMPHOCYTES NFR BLD AUTO: 31 % (ref 14–44)
MCH RBC QN AUTO: 31.7 PG (ref 26.8–34.3)
MCHC RBC AUTO-ENTMCNC: 32.1 G/DL (ref 31.4–37.4)
MCV RBC AUTO: 99 FL (ref 82–98)
MONOCYTES # BLD AUTO: 0.69 THOUSAND/ÂΜL (ref 0.17–1.22)
MONOCYTES NFR BLD AUTO: 14 % (ref 4–12)
NEUTROPHILS # BLD AUTO: 2.68 THOUSANDS/ÂΜL (ref 1.85–7.62)
NEUTS SEG NFR BLD AUTO: 52 % (ref 43–75)
NONHDLC SERPL-MCNC: 85 MG/DL
NRBC BLD AUTO-RTO: 0 /100 WBCS
PLATELET # BLD AUTO: 245 THOUSANDS/UL (ref 149–390)
PMV BLD AUTO: 10.4 FL (ref 8.9–12.7)
POTASSIUM SERPL-SCNC: 4.7 MMOL/L (ref 3.5–5.3)
PROT SERPL-MCNC: 7.3 G/DL (ref 6.4–8.4)
RBC # BLD AUTO: 4.41 MILLION/UL (ref 3.88–5.62)
SODIUM SERPL-SCNC: 139 MMOL/L (ref 135–147)
TRIGL SERPL-MCNC: 77 MG/DL (ref ?–150)
WBC # BLD AUTO: 5.08 THOUSAND/UL (ref 4.31–10.16)

## 2025-03-06 PROCEDURE — 80053 COMPREHEN METABOLIC PANEL: CPT

## 2025-03-06 PROCEDURE — 85025 COMPLETE CBC W/AUTO DIFF WBC: CPT

## 2025-03-06 PROCEDURE — 36415 COLL VENOUS BLD VENIPUNCTURE: CPT

## 2025-03-06 PROCEDURE — 80061 LIPID PANEL: CPT

## 2025-03-06 PROCEDURE — 97140 MANUAL THERAPY 1/> REGIONS: CPT | Performed by: PHYSICAL THERAPIST

## 2025-03-06 PROCEDURE — 97110 THERAPEUTIC EXERCISES: CPT | Performed by: PHYSICAL THERAPIST

## 2025-03-06 PROCEDURE — 97112 NEUROMUSCULAR REEDUCATION: CPT | Performed by: PHYSICAL THERAPIST

## 2025-03-06 NOTE — PROGRESS NOTES
Daily Note     Today's date: 3/6/2025  Patient name: Amrit Dolan  : 2005  MRN: [id number]  Referring provider: Mary Gabriel DO  Dx:   Encounter Diagnosis     ICD-10-CM    1. Insertional Achilles tendinopathy  M76.60                      Subjective: Scot explains that he had no soreness after last session. He is returning to school over the weekend and plans on starting to ramp his running as he approaches his return to [facility].      Objective: See treatment diary below      Assessment: Tolerated treatment well. Patient demonstrated fatigue post treatment and exhibited good technique with therapeutic exercises. Will plan on holding from PT at this point as he is returning to school and will receive treatment from the training team at [facility]. He was encouraged to focus his sequence of manuals and eccentric loading while back at school and to reach out with any other questions he may have.       Plan:  Hold     Precautions: None        Manuals 5/21 5/28 6/5 6/18  12/31  3/4  3/6         IASTM L Gastroc/Soleus RN RN RN RN  RN  RN  RN         Tiger tail calf RN RN RN RN  RN  RN  RN         Theragun          RN  RN  RN                                 Neuro Re-Ed                        SL BOSU balance            5x15''  5x15''            BOSU squats           2x10  2x10          Lunge walking              2 laps 10'                                                                                                         Ther Ex                       Standing gastroc/soleus s' 5x15'' ea Pro stretch 3x30'' Pro stretch 3x30'' Pro stretch 3x30'' Pro stretch 3x30''  Pro stretch 3x30''   Pro stretch 3x30''            Foam rolling calf hep     5'                Ice prn hep                     Self IASTM hep                                            Y balance                       Eccentric heel raises     2x10      3x10 lvl 1  3x10 lvl 1                                 Ther Activity                                                                        Gait Training                       Running form   5' midfoot strike pattern                   Krysta education   RN                   Education on nature of condition     RN RN               Modalities

## 2025-03-07 ENCOUNTER — RESULTS FOLLOW-UP (OUTPATIENT)
Dept: DERMATOLOGY | Facility: CLINIC | Age: 20
End: 2025-03-07

## 2025-03-07 DIAGNOSIS — L70.0 ACNE VULGARIS: Primary | ICD-10-CM

## 2025-03-07 RX ORDER — ISOTRETINOIN 30 MG/1
30 CAPSULE ORAL DAILY
Qty: 30 CAPSULE | Refills: 0 | Status: SHIPPED | OUTPATIENT
Start: 2025-03-07

## 2025-05-09 ENCOUNTER — APPOINTMENT (OUTPATIENT)
Dept: RADIOLOGY | Facility: CLINIC | Age: 20
End: 2025-05-09
Payer: COMMERCIAL

## 2025-05-09 ENCOUNTER — OFFICE VISIT (OUTPATIENT)
Dept: FAMILY MEDICINE CLINIC | Facility: CLINIC | Age: 20
End: 2025-05-09
Payer: COMMERCIAL

## 2025-05-09 ENCOUNTER — APPOINTMENT (OUTPATIENT)
Dept: LAB | Facility: CLINIC | Age: 20
End: 2025-05-09
Payer: COMMERCIAL

## 2025-05-09 VITALS
DIASTOLIC BLOOD PRESSURE: 70 MMHG | SYSTOLIC BLOOD PRESSURE: 136 MMHG | HEIGHT: 71 IN | OXYGEN SATURATION: 97 % | BODY MASS INDEX: 22.46 KG/M2 | HEART RATE: 58 BPM | RESPIRATION RATE: 16 BRPM | WEIGHT: 160.4 LBS | TEMPERATURE: 97.9 F

## 2025-05-09 DIAGNOSIS — Z00.00 ANNUAL PHYSICAL EXAM: Primary | ICD-10-CM

## 2025-05-09 DIAGNOSIS — Z11.1 SCREENING-PULMONARY TB: ICD-10-CM

## 2025-05-09 DIAGNOSIS — Z23 ENCOUNTER FOR IMMUNIZATION: ICD-10-CM

## 2025-05-09 DIAGNOSIS — R07.9 CHEST PAIN, UNSPECIFIED TYPE: ICD-10-CM

## 2025-05-09 LAB
ATRIAL RATE: 56 BPM
P AXIS: 77 DEGREES
PR INTERVAL: 162 MS
QRS AXIS: 83 DEGREES
QRSD INTERVAL: 124 MS
QT INTERVAL: 418 MS
QTC INTERVAL: 404 MS
T WAVE AXIS: 31 DEGREES
VENTRICULAR RATE: 56 BPM

## 2025-05-09 PROCEDURE — 90471 IMMUNIZATION ADMIN: CPT

## 2025-05-09 PROCEDURE — 71046 X-RAY EXAM CHEST 2 VIEWS: CPT

## 2025-05-09 PROCEDURE — 90715 TDAP VACCINE 7 YRS/> IM: CPT

## 2025-05-09 PROCEDURE — 93010 ELECTROCARDIOGRAM REPORT: CPT | Performed by: INTERNAL MEDICINE

## 2025-05-09 PROCEDURE — 99395 PREV VISIT EST AGE 18-39: CPT | Performed by: FAMILY MEDICINE

## 2025-05-09 PROCEDURE — 86580 TB INTRADERMAL TEST: CPT

## 2025-05-09 RX ORDER — OXYCODONE AND ACETAMINOPHEN 5; 325 MG/1; MG/1
1 TABLET ORAL EVERY 6 HOURS PRN
COMMUNITY
Start: 2025-04-11

## 2025-05-09 RX ORDER — NITROGLYCERIN 40 MG/1
PATCH TRANSDERMAL
COMMUNITY
Start: 2025-02-21

## 2025-05-09 RX ORDER — MELOXICAM 15 MG/1
TABLET ORAL
COMMUNITY
Start: 2025-02-19

## 2025-05-09 NOTE — PATIENT INSTRUCTIONS
"Patient Education     Routine physical for adults   The Basics   Written by the doctors and editors at [facility]   What is a physical? -- A physical is a routine visit, or \"check-up,\" with your doctor. You might also hear it called a \"wellness visit\" or \"preventive visit.\"  During each visit, the doctor will:   Ask about your physical and mental health   Ask about your habits, behaviors, and lifestyle   Do an exam   Give you vaccines if needed   Talk to you about any medicines you take   Give advice about your health   Answer your questions  Getting regular check-ups is an important part of taking care of your health. It can help your doctor find and treat any problems you have. But it's also important for preventing health problems.  A routine physical is different from a \"sick visit.\" A sick visit is when you see a doctor because of a health concern or problem. Since physicals are scheduled ahead of time, you can think about what you want to ask the doctor.  How often should I get a physical? -- It depends on your age and health. In general, for people age 21 years and older:   If you are younger than 50 years, you might be able to get a physical every 3 years.   If you are 50 years or older, your doctor might recommend a physical every year.  If you have an ongoing health condition, like diabetes or high blood pressure, your doctor will probably want to see you more often.  What happens during a physical? -- In general, each visit will include:   Physical exam - The doctor or nurse will check your height, weight, heart rate, and blood pressure. They will also look at your eyes and ears. They will ask about how you are feeling and whether you have any symptoms that bother you.   Medicines - It's a good idea to bring a list of all the medicines you take to each doctor visit. Your doctor will talk to you about your medicines and answer any questions. Tell them if you are having any side effects that bother you. You " "should also tell them if you are having trouble paying for any of your medicines.   Habits and behaviors - This includes:   Your diet   Your exercise habits   Whether you smoke, drink alcohol, or use drugs   Whether you are sexually active   Whether you feel safe at home  Your doctor will talk to you about things you can do to improve your health and lower your risk of health problems. They will also offer help and support. For example, if you want to quit smoking, they can give you advice and might prescribe medicines. If you want to improve your diet or get more physical activity, they can help you with this, too.   Lab tests, if needed - The tests you get will depend on your age and situation. For example, your doctor might want to check your:   Cholesterol   Blood sugar   Iron level   Vaccines - The recommended vaccines will depend on your age, health, and what vaccines you already had. Vaccines are very important because they can prevent certain serious or deadly infections.   Discussion of screening - \"Screening\" means checking for diseases or other health problems before they cause symptoms. Your doctor can recommend screening based on your age, risk, and preferences. This might include tests to check for:   Cancer, such as breast, prostate, cervical, ovarian, colorectal, prostate, lung, or skin cancer   Sexually transmitted infections, such as chlamydia and gonorrhea   Mental health conditions like depression and anxiety  Your doctor will talk to you about the different types of screening tests. They can help you decide which screenings to have. They can also explain what the results might mean.   Answering questions - The physical is a good time to ask the doctor or nurse questions about your health. If needed, they can refer you to other doctors or specialists, too.  Adults older than 65 years often need other care, too. As you get older, your doctor will talk to you about:   How to prevent falling at " home   Hearing or vision tests   Memory testing   How to take your medicines safely   Making sure that you have the help and support you need at home  All topics are updated as new evidence becomes available and our peer review process is complete.  This topic retrieved from [employer] on: May 02, 2024.  Topic 454954 Version 1.0  Release: 32.4.3 - C32.122  © 2024 UpToDate, Inc. and/or its affiliates. All rights reserved.  Consumer Information Use and Disclaimer   Disclaimer: This generalized information is a limited summary of diagnosis, treatment, and/or medication information. It is not meant to be comprehensive and should be used as a tool to help the user understand and/or assess potential diagnostic and treatment options. It does NOT include all information about conditions, treatments, medications, side effects, or risks that may apply to a specific patient. It is not intended to be medical advice or a substitute for the medical advice, diagnosis, or treatment of a health care provider based on the health care provider's examination and assessment of a patient's specific and unique circumstances. Patients must speak with a health care provider for complete information about their health, medical questions, and treatment options, including any risks or benefits regarding use of medications. This information does not endorse any treatments or medications as safe, effective, or approved for treating a specific patient. UpToDate, Inc. and its affiliates disclaim any warranty or liability relating to this information or the use thereof.The use of this information is governed by the Terms of Use, available at https://www.woltersExpertFlyeruwer.com/en/know/clinical-effectiveness-terms. 2024© UpToDate, Inc. and its affiliates and/or licensors. All rights reserved.  Copyright   © 2024 UpToDate, Inc. and/or its affiliates. All rights reserved.

## 2025-05-09 NOTE — PROGRESS NOTES
Adult Annual Physical  Name: Amrit Dolan      : 2005      MRN: [id number]  Encounter Provider: Mary Gabriel DO  Encounter Date: 2025   Encounter department: [facility]    :  Assessment & Plan  Annual physical exam         Encounter for immunization    Orders:    TDAP VACCINE GREATER THAN OR EQUAL TO 8YO IM    Chest pain, unspecified type    Orders:    XR chest pa and lateral; Future    ECG 12 lead; Future    Echo complete w/ contrast if indicated; Future  ER precautions reviewed for new or worsening symptoms   Screening-pulmonary TB    Orders:    TB Skin Test        Preventive Screenings:  - Diabetes Screening: screening up-to-date  - Lung cancer screening: screening not indicated   - Prostate cancer screening: screening not indicated     Immunizations:  - Immunizations due: Tdap         History of Present Illness     Adult Annual Physical:  Patient presents for annual physical.     Diet and Physical Activity:  - Diet/Nutrition: well balanced diet.  - Exercise: moderate cardiovascular exercise.    Depression Screening:  - PHQ-2 Score: 0    Review of Systems   Constitutional:  Negative for chills and fever.   HENT:  Negative for congestion, postnasal drip, rhinorrhea and sinus pain.    Eyes:  Negative for photophobia and visual disturbance.   Respiratory:  Negative for cough and shortness of breath.    Cardiovascular:  Positive for chest pain. Negative for palpitations and leg swelling.   Gastrointestinal:  Negative for abdominal pain, constipation, diarrhea, nausea and vomiting.   Genitourinary:  Negative for difficulty urinating and dysuria.   Musculoskeletal:  Negative for arthralgias and myalgias.   Skin:  Negative for rash.   Neurological:  Negative for dizziness and syncope.     Pertinent Medical History         Medical History Reviewed by provider this encounter:  Tobacco  Allergies  Meds  Problems  Med Hx  Surg Hx  Fam Hx     .  Past Medical  History   Past Medical History:   Diagnosis Date    Acne 2020     History reviewed. No pertinent surgical history.  Family History   Problem Relation Age of Onset    Autoimmune disease Mother     Acne Father     Arthritis Father     Diabetes Maternal Grandfather         Type 2    Cancer Maternal Grandfather     Hypertension Maternal Grandfather     Acne Maternal Grandmother     Hypertension Maternal Grandmother     Acne Maternal Uncle       reports that he has never smoked. He has never been exposed to tobacco smoke. He has never used smokeless tobacco. He reports that he does not drink alcohol and does not use drugs.  Current Outpatient Medications   Medication Instructions    clindamycin (CLEOCIN T) 1 % lotion Topical, Daily, To face and affected areas of back.    ISOtretinoin (ACCUTANE) 30 mg, Oral, Daily, With a glass of water and a fatty meal.    meloxicam (MOBIC) 15 mg tablet TAKE 1 TABLET BY MOUTH DAILY FOR 14 DAYS, THEN AS NEEDED    Meloxicam 15 MG TBDP     nitroglycerin (NITRODUR) 0.2 mg/hr     oxyCODONE-acetaminophen (PERCOCET) 5-325 mg per tablet 1 tablet, Every 6 hours PRN   No Known Allergies   Current Outpatient Medications on File Prior to Visit   Medication Sig Dispense Refill    ISOtretinoin (ACCUTANE) 30 MG capsule Take 1 capsule (30 mg total) by mouth daily With a glass of water and a fatty meal. (Patient taking differently: Take 30 mg by mouth daily With a glass of water and a fatty meal. Not taking yet as of 5/9/25) 30 capsule 0    clindamycin (CLEOCIN T) 1 % lotion Apply topically in the morning To face and affected areas of back. (Patient not taking: Reported on 5/9/2025) 60 mL 3    meloxicam (MOBIC) 15 mg tablet TAKE 1 TABLET BY MOUTH DAILY FOR 14 DAYS, THEN AS NEEDED (Patient not taking: Reported on 5/9/2025)      Meloxicam 15 MG TBDP  (Patient not taking: Reported on 5/9/2025)      nitroglycerin (NITRODUR) 0.2 mg/hr  (Patient not taking: Reported on 5/9/2025)      oxyCODONE-acetaminophen  "(PERCOCET) 5-325 mg per tablet Take 1 tablet by mouth every 6 (six) hours as needed (Patient not taking: Reported on 5/9/2025)       No current facility-administered medications on file prior to visit.      Social History     Tobacco Use    Smoking status: Never     Passive exposure: Never    Smokeless tobacco: Never   Vaping Use    Vaping status: Never Used   Substance and Sexual Activity    Alcohol use: Never    Drug use: Never    Sexual activity: Never       Objective   /70 (BP Location: Left arm, Patient Position: Sitting, Cuff Size: Standard)   Pulse 58   Temp 97.9 °F (36.6 °C) (Tympanic)   Resp 16   Ht 5' 10.7\" (1.796 m)   Wt 72.8 kg (160 lb 6.4 oz)   SpO2 97%   BMI 22.56 kg/m²     Physical Exam  Constitutional:       General: He is not in acute distress.     Appearance: Normal appearance. He is not ill-appearing, toxic-appearing or diaphoretic.   HENT:      Head: Normocephalic and atraumatic.      Right Ear: Tympanic membrane and ear canal normal.      Left Ear: Tympanic membrane and ear canal normal.      Nose: Nose normal. No congestion.      Mouth/Throat:      Mouth: Mucous membranes are moist.      Pharynx: Oropharynx is clear. No oropharyngeal exudate.   Eyes:      Extraocular Movements: Extraocular movements intact.      Conjunctiva/sclera: Conjunctivae normal.      Pupils: Pupils are equal, round, and reactive to light.   Cardiovascular:      Rate and Rhythm: Normal rate and regular rhythm.      Pulses: Normal pulses.      Heart sounds: Murmur heard.   Pulmonary:      Effort: Pulmonary effort is normal.      Breath sounds: Normal breath sounds. No wheezing, rhonchi or rales.   Abdominal:      General: Bowel sounds are normal. There is no distension.      Palpations: Abdomen is soft.      Tenderness: There is no abdominal tenderness.   Musculoskeletal:         General: No swelling or tenderness. Normal range of motion.      Cervical back: Normal range of motion and neck supple.   Skin:     " General: Skin is warm and dry.      Capillary Refill: Capillary refill takes less than 2 seconds.   Neurological:      General: No focal deficit present.      Mental Status: He is alert and oriented to person, place, and time.      Cranial Nerves: No cranial nerve deficit.   Psychiatric:         Mood and Affect: Mood normal.         Behavior: Behavior normal.         Thought Content: Thought content normal.

## 2025-05-11 ENCOUNTER — APPOINTMENT (OUTPATIENT)
Dept: RADIOLOGY | Facility: HOSPITAL | Age: 20
End: 2025-05-11
Payer: COMMERCIAL

## 2025-05-11 ENCOUNTER — HOSPITAL ENCOUNTER (EMERGENCY)
Facility: HOSPITAL | Age: 20
Discharge: HOME | End: 2025-05-11
Attending: EMERGENCY MEDICINE
Payer: COMMERCIAL

## 2025-05-11 VITALS
HEIGHT: 71 IN | SYSTOLIC BLOOD PRESSURE: 133 MMHG | OXYGEN SATURATION: 100 % | TEMPERATURE: 98 F | DIASTOLIC BLOOD PRESSURE: 76 MMHG | WEIGHT: 160 LBS | HEART RATE: 45 BPM | BODY MASS INDEX: 22.4 KG/M2 | RESPIRATION RATE: 20 BRPM

## 2025-05-11 DIAGNOSIS — M94.0 COSTOCHONDRITIS: Primary | ICD-10-CM

## 2025-05-11 LAB
ALBUMIN SERPL BCG-MCNC: 4.4 G/DL (ref 3.5–5)
ALP SERPL-CCNC: 89 U/L (ref 34–104)
ALT SERPL W P-5'-P-CCNC: 23 U/L (ref 7–52)
ANION GAP SERPL CALCULATED.3IONS-SCNC: 7 MMOL/L (ref 4–13)
AST SERPL W P-5'-P-CCNC: 19 U/L (ref 13–39)
ATRIAL RATE: 52 BPM
BASOPHILS # BLD AUTO: 0.02 THOUSANDS/ÂΜL (ref 0–0.1)
BASOPHILS NFR BLD AUTO: 0 % (ref 0–1)
BILIRUB SERPL-MCNC: 0.42 MG/DL (ref 0.2–1)
BUN SERPL-MCNC: 20 MG/DL (ref 5–25)
CALCIUM SERPL-MCNC: 9.3 MG/DL (ref 8.4–10.2)
CARDIAC TROPONIN I PNL SERPL HS: 3 NG/L (ref ?–50)
CHLORIDE SERPL-SCNC: 102 MMOL/L (ref 96–108)
CO2 SERPL-SCNC: 29 MMOL/L (ref 21–32)
CREAT SERPL-MCNC: 0.64 MG/DL (ref 0.6–1.3)
EOSINOPHIL # BLD AUTO: 0.13 THOUSAND/ÂΜL (ref 0–0.61)
EOSINOPHIL NFR BLD AUTO: 2 % (ref 0–6)
ERYTHROCYTE [DISTWIDTH] IN BLOOD BY AUTOMATED COUNT: 12.2 % (ref 11.6–15.1)
GFR SERPL CREATININE-BSD FRML MDRD: 139 ML/MIN/1.73SQ M
GLUCOSE SERPL-MCNC: 114 MG/DL (ref 65–140)
HCT VFR BLD AUTO: 40.2 % (ref 36.5–49.3)
HGB BLD-MCNC: 13.7 G/DL (ref 12–17)
IMM GRANULOCYTES # BLD AUTO: 0.02 THOUSAND/UL (ref 0–0.2)
IMM GRANULOCYTES NFR BLD AUTO: 0 % (ref 0–2)
LYMPHOCYTES # BLD AUTO: 1.59 THOUSANDS/ÂΜL (ref 0.6–4.47)
LYMPHOCYTES NFR BLD AUTO: 30 % (ref 14–44)
MCH RBC QN AUTO: 32.2 PG (ref 26.8–34.3)
MCHC RBC AUTO-ENTMCNC: 34.1 G/DL (ref 31.4–37.4)
MCV RBC AUTO: 95 FL (ref 82–98)
MONOCYTES # BLD AUTO: 0.65 THOUSAND/ÂΜL (ref 0.17–1.22)
MONOCYTES NFR BLD AUTO: 12 % (ref 4–12)
NEUTROPHILS # BLD AUTO: 2.97 THOUSANDS/ÂΜL (ref 1.85–7.62)
NEUTS SEG NFR BLD AUTO: 56 % (ref 43–75)
NRBC BLD AUTO-RTO: 0 /100 WBCS
P AXIS: 83 DEGREES
PLATELET # BLD AUTO: 212 THOUSANDS/UL (ref 149–390)
PMV BLD AUTO: 9.5 FL (ref 8.9–12.7)
POTASSIUM SERPL-SCNC: 3.7 MMOL/L (ref 3.5–5.3)
PR INTERVAL: 174 MS
PROT SERPL-MCNC: 7.1 G/DL (ref 6.4–8.4)
QRS AXIS: 96 DEGREES
QRSD INTERVAL: 118 MS
QT INTERVAL: 400 MS
QTC INTERVAL: 372 MS
RBC # BLD AUTO: 4.25 MILLION/UL (ref 3.88–5.62)
SODIUM SERPL-SCNC: 138 MMOL/L (ref 135–147)
T WAVE AXIS: 62 DEGREES
VENTRICULAR RATE: 52 BPM
WBC # BLD AUTO: 5.38 THOUSAND/UL (ref 4.31–10.16)

## 2025-05-11 PROCEDURE — 80053 COMPREHEN METABOLIC PANEL: CPT

## 2025-05-11 PROCEDURE — 93005 ELECTROCARDIOGRAM TRACING: CPT

## 2025-05-11 PROCEDURE — 99284 EMERGENCY DEPT VISIT MOD MDM: CPT | Performed by: EMERGENCY MEDICINE

## 2025-05-11 PROCEDURE — 99285 EMERGENCY DEPT VISIT HI MDM: CPT

## 2025-05-11 PROCEDURE — 85025 COMPLETE CBC W/AUTO DIFF WBC: CPT

## 2025-05-11 PROCEDURE — 93010 ELECTROCARDIOGRAM REPORT: CPT | Performed by: INTERNAL MEDICINE

## 2025-05-11 PROCEDURE — 71046 X-RAY EXAM CHEST 2 VIEWS: CPT

## 2025-05-11 PROCEDURE — 84484 ASSAY OF TROPONIN QUANT: CPT

## 2025-05-11 PROCEDURE — 36415 COLL VENOUS BLD VENIPUNCTURE: CPT

## 2025-05-11 RX ORDER — NAPROXEN 500 MG/1
500 TABLET ORAL ONCE
Status: COMPLETED | OUTPATIENT
Start: 2025-05-11 | End: 2025-05-11

## 2025-05-11 RX ORDER — FAMOTIDINE 20 MG/1
20 TABLET, FILM COATED ORAL ONCE
Status: COMPLETED | OUTPATIENT
Start: 2025-05-11 | End: 2025-05-11

## 2025-05-11 RX ORDER — NAPROXEN 500 MG/1
500 TABLET ORAL 2 TIMES DAILY PRN
Qty: 30 TABLET | Refills: 0 | Status: SHIPPED | OUTPATIENT
Start: 2025-05-11

## 2025-05-11 RX ADMIN — FAMOTIDINE 20 MG: 20 TABLET, FILM COATED ORAL at 16:50

## 2025-05-11 RX ADMIN — NAPROXEN 500 MG: 500 TABLET ORAL at 16:50

## 2025-05-11 NOTE — ED PROVIDER NOTES
Time reflects when diagnosis was documented in both MDM as applicable and the Disposition within this note       Time User Action Codes Description Comment    5/11/2025  4:44 PM Azul Munoz Add [M94.0] Costochondritis           ED Disposition       ED Disposition   Discharge    Condition   Stable    Date/Time   Sun May 11, 2025  4:46 PM    Comment   Amrit Dolan discharge to home/self care.                   Assessment & Plan       Medical Decision Making  Obtain blood work, EKG, chest x-ray  Continue to monitor patient for any worsening symptoms    Lab and radiology results were essentially unremarkable.  Patient has baseline bradycardia.  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  EKG unchanged from previous study.  At this time, based on history and physical, patient symptoms are most likely consistent with costochondritis.  Patient placed on NSAIDs and Pepcid as needed any reflux symptoms.  Patient discharged home with follow-up to PCP as well as cardiology for any worsening symptoms.  Close return instructions given to return to the ER for any worsening symptoms.  Patient agrees with discharge plan.  Patient well appearing at time of discharge.    Please Note: Fluency Direct voice recognition software may have been used in the creation of this document. Wrong words or sound a like substitutions may have occurred due to the inherent limitations of the voice software.         Risk  Prescription drug management.             Medications   naproxen (NAPROSYN) tablet 500 mg (500 mg Oral Given 5/11/25 1650)   famotidine (PEPCID) tablet 20 mg (20 mg Oral Given 5/11/25 1650)       ED Risk Strat Scores   HEART Risk Score      Flowsheet Row Most Recent Value   Heart Score Risk Calculator    History 0 Filed at: 05/11/2025 1657   ECG 1 Filed at: 05/11/2025 1657   Age 0 Filed at: 05/11/2025 1657   Risk Factors 0 Filed at: 05/11/2025 1657   Troponin 0 Filed at: 05/11/2025 1657   HEART Score 1  Filed at: 05/11/2025 1657          HEART Risk Score      Flowsheet Row Most Recent Value   Heart Score Risk Calculator    History 0 Filed at: 05/11/2025 1657   ECG 1 Filed at: 05/11/2025 1657   Age 0 Filed at: 05/11/2025 1657   Risk Factors 0 Filed at: 05/11/2025 1657   Troponin 0 Filed at: 05/11/2025 1657   HEART Score 1 Filed at: 05/11/2025 1657                      No data recorded        SBIRT 20yo+      Flowsheet Row Most Recent Value   Initial Alcohol Screen: US AUDIT-C     1. How often do you have a drink containing alcohol? 0 Filed at: 05/11/2025 1630   2. How many drinks containing alcohol do you have on a typical day you are drinking?  0 Filed at: 05/11/2025 1630   3a. Male UNDER 65: How often do you have five or more drinks on one occasion? 0 Filed at: 05/11/2025 1630   3b. FEMALE Any Age, or MALE 65+: How often do you have 4 or more drinks on one occassion? 0 Filed at: 05/11/2025 1630   Audit-C Score 0 Filed at: 05/11/2025 1630   FAVIAN: How many times in the past year have you...    Used an illegal drug or used a prescription medication for non-medical reasons? Never Filed at: 05/11/2025 1630                            History of Present Illness       Chief Complaint   Patient presents with    Chest Pain     Pt reports chest pain intermittent since Tuesday night. Pt reports pain increase with inhalation.        Past Medical History:   Diagnosis Date    Acne 2020      History reviewed. No pertinent surgical history.   Family History   Problem Relation Age of Onset    Autoimmune disease Mother     Acne Father     Arthritis Father     Diabetes Maternal Grandfather         Type 2    Cancer Maternal Grandfather     Hypertension Maternal Grandfather     Acne Maternal Grandmother     Hypertension Maternal Grandmother     Acne Maternal Uncle       Social History     Tobacco Use    Smoking status: Never     Passive exposure: Never    Smokeless tobacco: Never   Vaping Use    Vaping status: Never Used   Substance  Use Topics    Alcohol use: Never    Drug use: Never      E-Cigarette/Vaping    E-Cigarette Use Never User       E-Cigarette/Vaping Substances    Nicotine No     THC No     CBD No     Flavoring No     Other No     Unknown No       I have reviewed and agree with the history as documented.     19-year-old male presents to the ED for evaluation of left-sided pain in the sternal border over the past few days.  Pain started after patient went swimming.  Patient did not take anything for pain at home.  Patient states that pain increases with deep inspiration and decreases with rest.  Patient denies any fevers or chills.  Patient denies any cold or cough symptoms.  Patient came to the ED for further evaluation.      Chest Pain  Associated symptoms: no abdominal pain, no back pain, no cough, no fever, no palpitations, no shortness of breath and not vomiting        Review of Systems   Constitutional:  Negative for chills and fever.   HENT:  Negative for ear pain and sore throat.    Eyes:  Negative for pain and visual disturbance.   Respiratory:  Negative for cough and shortness of breath.    Cardiovascular:  Positive for chest pain. Negative for palpitations.   Gastrointestinal:  Negative for abdominal pain and vomiting.   Genitourinary:  Negative for dysuria and hematuria.   Musculoskeletal:  Negative for arthralgias and back pain.   Skin:  Negative for color change and rash.   Neurological:  Negative for seizures and syncope.   All other systems reviewed and are negative.          Objective       ED Triage Vitals [05/11/25 1427]   Temperature Pulse Blood Pressure Respirations SpO2 Patient Position - Orthostatic VS   98 °F (36.7 °C) (!) 54 148/66 18 98 % Sitting      Temp Source Heart Rate Source BP Location FiO2 (%) Pain Score    Temporal Monitor Left arm -- 5      Vitals      Date and Time Temp Pulse SpO2 Resp BP Pain Score FACES Pain Rating User   05/11/25 1630 -- 45 100 % 20 133/76 -- -- AM   05/11/25 1427 98 °F (36.7  °C) 54 98 % 18 148/66 5 -- CM            Physical Exam  Vitals and nursing note reviewed.   Constitutional:       General: He is not in acute distress.     Appearance: He is well-developed.   HENT:      Head: Normocephalic and atraumatic.   Eyes:      Conjunctiva/sclera: Conjunctivae normal.   Cardiovascular:      Rate and Rhythm: Normal rate and regular rhythm.      Heart sounds: No murmur heard.  Pulmonary:      Effort: Pulmonary effort is normal. No respiratory distress.      Breath sounds: Normal breath sounds.      Comments: Lungs are clear to auscultation bilaterally.  Tenderness to palpation noted to the left sternal border over costochondral joints.  Abdominal:      Palpations: Abdomen is soft.      Tenderness: There is no abdominal tenderness.   Musculoskeletal:         General: No swelling.      Cervical back: Neck supple.   Skin:     General: Skin is warm and dry.      Capillary Refill: Capillary refill takes less than 2 seconds.   Neurological:      Mental Status: He is alert.   Psychiatric:         Mood and Affect: Mood normal.         Results Reviewed       Procedure Component Value Units Date/Time    HS Troponin I 4hr [[id number]]     Lab Status: No result Specimen: Blood     HS Troponin 0hr (reflex protocol) [[id number]]  (Normal) Collected: 05/11/25 1430    Lab Status: Final result Specimen: Blood from Arm, Left Updated: 05/11/25 1503     hs TnI 0hr 3 ng/L     HS Troponin I 2hr [[id number]]     Lab Status: No result Specimen: Blood     Comprehensive metabolic panel [[id number]] Collected: 05/11/25 1430    Lab Status: Final result Specimen: Blood from Arm, Left Updated: 05/11/25 1454     Sodium 138 mmol/L      Potassium 3.7 mmol/L      Chloride 102 mmol/L      CO2 29 mmol/L      ANION GAP 7 mmol/L      BUN 20 mg/dL      Creatinine 0.64 mg/dL      Glucose 114 mg/dL      Calcium 9.3 mg/dL      AST 19 U/L      ALT 23 U/L      Alkaline Phosphatase 89 U/L      Total Protein 7.1 g/dL      Albumin 4.4 g/dL       Total Bilirubin 0.42 mg/dL      eGFR 139 ml/min/1.73sq m     Narrative:      National Kidney Disease Foundation guidelines for Chronic Kidney Disease (CKD):     Stage 1 with normal or high GFR (GFR > 90 mL/min/1.73 square meters)    Stage 2 Mild CKD (GFR = 60-89 mL/min/1.73 square meters)    Stage 3A Moderate CKD (GFR = 45-59 mL/min/1.73 square meters)    Stage 3B Moderate CKD (GFR = 30-44 mL/min/1.73 square meters)    Stage 4 Severe CKD (GFR = 15-29 mL/min/1.73 square meters)    Stage 5 End Stage CKD (GFR <15 mL/min/1.73 square meters)  Note: GFR calculation is accurate only with a steady state creatinine    CBC and differential [[id number]] Collected: 05/11/25 1430    Lab Status: Final result Specimen: Blood from Arm, Left Updated: 05/11/25 1440     WBC 5.38 Thousand/uL      RBC 4.25 Million/uL      Hemoglobin 13.7 g/dL      Hematocrit 40.2 %      MCV 95 fL      MCH 32.2 pg      MCHC 34.1 g/dL      RDW 12.2 %      MPV 9.5 fL      Platelets 212 Thousands/uL      nRBC 0 /100 WBCs      Segmented % 56 %      Immature Grans % 0 %      Lymphocytes % 30 %      Monocytes % 12 %      Eosinophils Relative 2 %      Basophils Relative 0 %      Absolute Neutrophils 2.97 Thousands/µL      Absolute Immature Grans 0.02 Thousand/uL      Absolute Lymphocytes 1.59 Thousands/µL      Absolute Monocytes 0.65 Thousand/µL      Eosinophils Absolute 0.13 Thousand/µL      Basophils Absolute 0.02 Thousands/µL             XR chest 2 views    (Results Pending)       ECG 12 Lead Documentation Only    Date/Time: 5/11/2025 2:31 PM    Performed by: Azul Munoz DO  Authorized by: Azul Munoz DO    Indications / Diagnosis:  Chest pain  ECG reviewed by me, the ED Provider: yes    Patient location:  ED  Previous ECG:     Previous ECG:  Compared to current    Similarity:  No change    Comparison to cardiac monitor: Yes    Interpretation:     Interpretation: abnormal    Comments:      Sinus rhythm, rate 52, right axis deviation, normal  intervals, no acute ST/T wave abnormalities noted, otherwise unremarkable EKG, widened P waves noted suggesting possible left atrial enlargement that is unchanged from previous study.      ED Medication and Procedure Management   Prior to Admission Medications   Prescriptions Last Dose Informant Patient Reported? Taking?   ISOtretinoin (ACCUTANE) 30 MG capsule  Self No No   Sig: Take 1 capsule (30 mg total) by mouth daily With a glass of water and a fatty meal.   Patient taking differently: Take 30 mg by mouth daily With a glass of water and a fatty meal. Not taking yet as of 5/9/25   Meloxicam 15 MG TBDP  Self Yes No   Patient not taking: Reported on 5/9/2025   clindamycin (CLEOCIN T) 1 % lotion  Self No No   Sig: Apply topically in the morning To face and affected areas of back.   Patient not taking: Reported on 5/9/2025   meloxicam (MOBIC) 15 mg tablet  Self Yes No   Sig: TAKE 1 TABLET BY MOUTH DAILY FOR 14 DAYS, THEN AS NEEDED   Patient not taking: Reported on 5/9/2025   nitroglycerin (NITRODUR) 0.2 mg/hr  Self Yes No   Patient not taking: Reported on 5/9/2025   oxyCODONE-acetaminophen (PERCOCET) 5-325 mg per tablet  Self Yes No   Sig: Take 1 tablet by mouth every 6 (six) hours as needed   Patient not taking: Reported on 5/9/2025      Facility-Administered Medications: None     Discharge Medication List as of 5/11/2025  4:47 PM        START taking these medications    Details   naproxen (Naprosyn) 500 mg tablet Take 1 tablet (500 mg total) by mouth 2 (two) times a day as needed for moderate pain (with meals), Starting Sun 5/11/2025, Normal           CONTINUE these medications which have NOT CHANGED    Details   clindamycin (CLEOCIN T) 1 % lotion Apply topically in the morning To face and affected areas of back., Starting Tue 12/17/2024, Normal      ISOtretinoin (ACCUTANE) 30 MG capsule Take 1 capsule (30 mg total) by mouth daily With a glass of water and a fatty meal., Starting Fri 3/7/2025, Normal       meloxicam (MOBIC) 15 mg tablet TAKE 1 TABLET BY MOUTH DAILY FOR 14 DAYS, THEN AS NEEDED, Historical Med      Meloxicam 15 MG TBDP Historical Med      nitroglycerin (NITRODUR) 0.2 mg/hr Historical Med      oxyCODONE-acetaminophen (PERCOCET) 5-325 mg per tablet Take 1 tablet by mouth every 6 (six) hours as needed, Starting Fri 4/11/2025, Historical Med           No discharge procedures on file.  ED SEPSIS DOCUMENTATION   Time reflects when diagnosis was documented in both MDM as applicable and the Disposition within this note       Time User Action Codes Description Comment    5/11/2025  4:44 PM Azul Munoz Add [M94.0] Costochondritis                  Azul Munoz DO  05/11/25 2736

## 2025-05-12 ENCOUNTER — RESULTS FOLLOW-UP (OUTPATIENT)
Dept: FAMILY MEDICINE CLINIC | Facility: CLINIC | Age: 20
End: 2025-05-12

## 2025-05-12 ENCOUNTER — CLINICAL SUPPORT (OUTPATIENT)
Dept: FAMILY MEDICINE CLINIC | Facility: CLINIC | Age: 20
End: 2025-05-12

## 2025-05-12 DIAGNOSIS — R07.9 CHEST PAIN, UNSPECIFIED TYPE: Primary | ICD-10-CM

## 2025-05-12 DIAGNOSIS — Z11.1 ENCOUNTER FOR PPD SKIN TEST READING: Primary | ICD-10-CM

## 2025-05-12 LAB
INDURATION: 0 MM
TB SKIN TEST: NEGATIVE

## 2025-05-12 PROCEDURE — NURSE

## 2025-05-24 ENCOUNTER — PATIENT MESSAGE (OUTPATIENT)
Dept: DERMATOLOGY | Facility: CLINIC | Age: 20
End: 2025-05-24

## 2025-06-05 ENCOUNTER — OFFICE VISIT (OUTPATIENT)
Dept: PHYSICAL THERAPY | Facility: CLINIC | Age: 20
End: 2025-06-05
Attending: FAMILY MEDICINE
Payer: COMMERCIAL

## 2025-06-05 DIAGNOSIS — M25.572 CHRONIC PAIN OF LEFT ANKLE: Primary | ICD-10-CM

## 2025-06-05 DIAGNOSIS — G89.29 CHRONIC PAIN OF LEFT ANKLE: Primary | ICD-10-CM

## 2025-06-05 PROCEDURE — 97161 PT EVAL LOW COMPLEX 20 MIN: CPT | Performed by: PHYSICAL THERAPIST

## 2025-06-05 NOTE — PROGRESS NOTES
PT Evaluation     Today's date: 2025  Patient name: Amrit Dolan  : 2005  MRN: [id number]  Referring provider: Mary Gabriel DO  Dx:   Encounter Diagnosis     ICD-10-CM    1. Chronic pain of left ankle  M25.572     G89.29                            Assessment  Impairments: abnormal muscle firing, abnormal or restricted ROM, activity intolerance, lacks appropriate home exercise program and pain with function    Assessment details: Amrit Dolan is a 19 y.o. male presenting to physical therapy with chronic left calf/achilles tedinopathy. He is s/p PRP injection and is transitioning back toward running in another 4 weeks. Added in dynamic stability and balance this session with RDLs, dynamic surfaces with vision elimination and stability training on his left LE. Patient presents with pain, decreased calf extensibility and range of motion, tenderness to palpation and decreased tolerance to activity. Due to these impairments patient has difficulty performing sport related activities and recreational activities.  Barriers to therapy: Patient returns to school this weekend    Goals    Impairment Goals:  1.) Pt will have decreased sx at rest to 0/10 in 2-3 weeks.  2.) Pt will have improved ankle strength by 1/2 MMT in 3-4 weeks.  3.) Pt will have decreased tenderness to palpation to soleus by 50% in 1-2 weeks.  4.) Pt will have improved ankle range of motion to WNL as compared B in 4-6 weeks.    Functional Goals:  1.) Pt will be independent in their home exercise program in 1 week.  2.) Pt will be able to run on flat surfaces without difficulty or pain in 2-4 weeks.  3.) Pt will be able to resume all ADL's without calf pain in 1-2 weeks.  4.) Pt will have an improved FOTO score of 100/100 in 2-4 weeks.    Plan  Patient would benefit from: skilled physical therapy    Planned therapy interventions: home exercise program, therapeutic exercise, strengthening, stretching, patient education,  behavior modification, manual therapy, work reintegration, gait training, neuromuscular re-education, graded activity and graded exercise    Frequency: 1x week  Plan of Care beginning date: 6/5/2025  Plan of Care expiration date: 7/5/2025        Subjective Evaluation    History of Present Illness  Mechanism of injury: Scot is a 19 year old male presenting to physical therapy with ongoing left calf/achilles pain.   He underwent PRP injection, was non weight bearing for a short time but has transitioned successfully out of the boot and into a sneaker again. He has been cross training, swimming mostly. He inquires about appropriate exercises he can do now, is going to be able to go back to running on July 11th and is looking forward to this.     He is having very minimal pain and feels he is healing well. He wants to make sure that he is doing everything he can to optimize his progress. He has an anti-gravity treadmill at his physical therapy location out at school.     Prior RA: He explains that he was ramping back up into running, getting up to 8 miles/day. He also explains that he was working with the trainers at school and they added in jump training, this seemed to flare things up. He notes that he saw a provider that gave him a medicated patch and put him into a boot temporarily while they ruled out a stress fracture, this came back negative. He has continued to cross train, biking and swimming have been a non issue for his pain levels. He has continued to do his self treatment; scraping, massaging and stretching as much as possible.  He has about a month to go before [facility].    Prior Evaluation: He explains that things went well into his season but have seemed to flare up again. At the end of his season he was getting upwards of 100 miles per week. His pain feels very similar to his discomfort last time, he has fallen off his self treatment a bit. He notes that when he is on his feet for long periods of  "time, like working at [employer] for 12 hours, his achilles starts to bother him at the insertion point. He has very little discomfort first thing in the morning.     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 At his peak he puts about 85 miles of running in per week. He runs in [employer] and Chris shoes, alternative wearing them, and gets new pairs 4x/year.     Eases: ice, \"scraping\", rest, stretching  Patient Goals  Patient goals for therapy: decreased pain, return to sport/leisure activities and increased motion    Pain  Current pain ratin  At best pain ratin  At worst pain ratin  Quality: sharp and tight  Relieving factors: change in position and ice  Aggravating factors: running  Progression: no change    Treatments  Current treatment: injection treatment and physical therapy      Objective     Palpation   Left   No palpable tenderness to the lateral gastrocnemius.   Tenderness of the medial gastrocnemius and soleus.     Tenderness   Left Ankle/Foot   Tenderness in the Achilles insertion. No tenderness in the plantar fascia.     Active Range of Motion   Left Ankle/Foot   Dorsiflexion (ke): 6 degrees   Dorsiflexion (kf): 12 degrees   Plantar flexion: WFL    Right Ankle/Foot   Dorsiflexion (ke): 10 degrees   Dorsiflexion (kf): 18 degrees   Plantar flexion: WFL    Strength/Myotome Testing     Left Ankle/Foot   Dorsiflexion: 5  Plantar flexion: 5 (Slight achilles pain)  Inversion: 5  Eversion: 5    Right Ankle/Foot   Dorsiflexion: 5  Plantar flexion: 5  Inversion: 5  Eversion: 5    Tests     Left Hip   Negative Ely's.     Right Hip   Negative Ely's.          Precautions: None        Manuals 5/21 5/28 6/5 6/18  12/31  3/4  6/5         IASTM L Gastroc/Soleus RN RN RN RN  RN  RN  RN         Tiger tail calf RN RN RN RN  RN  RN           Theragun          RN  RN                                   Neuro Re-Ed                        SL BOSU balance            5x15''            BOSU " squats           2x10            Tandem stance EC on foam              3x30''          Bulg split squats              x10          RDLs             x10           lat/post sliders             X10 ea                                 Ther Ex                       Standing gastroc/soleus s' 5x15'' ea Pro stretch 3x30'' Pro stretch 3x30'' Pro stretch 3x30'' Pro stretch 3x30''  Pro stretch 3x30''             Foam rolling calf hep     5'              Ice prn hep                     Self IASTM hep                                            Y balance                       Eccentric heel raises     2x10      3x10 lvl 1                                   Ther Activity                                                                       Gait Training                       Running form   5' midfoot strike pattern                   Krysta education   RN                   Education on nature of condition     RN RN               Modalities

## 2025-06-06 ENCOUNTER — HOSPITAL ENCOUNTER (OUTPATIENT)
Dept: NON INVASIVE DIAGNOSTICS | Facility: HOSPITAL | Age: 20
Discharge: HOME | End: 2025-06-06
Payer: COMMERCIAL

## 2025-06-06 VITALS
BODY MASS INDEX: 22.4 KG/M2 | HEIGHT: 71 IN | HEART RATE: 47 BPM | DIASTOLIC BLOOD PRESSURE: 76 MMHG | SYSTOLIC BLOOD PRESSURE: 133 MMHG | WEIGHT: 160 LBS

## 2025-06-06 DIAGNOSIS — R07.9 CHEST PAIN, UNSPECIFIED TYPE: ICD-10-CM

## 2025-06-06 LAB
AORTIC ROOT: 3.3 CM
AORTIC VALVE MEAN VELOCITY: 7.8 M/S
AV LVOT MEAN GRADIENT: 2 MMHG
AV MEAN PRESS GRAD SYS DOP V1V2: 3 MMHG
AV VELOCITY RATIO: 0.95
BSA FOR ECHO PROCEDURE: 1.92 M2
DOP CALC AO VTI: 22.2 CM
DOP CALC LVOT PEAK VEL VTI: 21 CM
E WAVE DECELERATION TIME: 163 MS
FRACTIONAL SHORTENING: 28 (ref 28–44)
INTERVENTRICULAR SEPTUM IN DIASTOLE (PARASTERNAL SHORT AXIS VIEW): 1.1 CM
INTERVENTRICULAR SEPTUM: 1.1 CM (ref 0.6–1.1)
LA/AORTA RATIO 2D: 1.03
LAAS-AP2: 23 CM2
LEFT ATRIUM SIZE: 3.4 CM
LEFT INTERNAL DIMENSION IN SYSTOLE: 3.3 CM (ref 2.1–4)
LEFT VENTRICLE DIASTOLIC VOLUME (MOD BIPLANE): 207 ML
LEFT VENTRICLE DIASTOLIC VOLUME INDEX (MOD BIPLANE): 107.8 ML/M2
LEFT VENTRICLE SYSTOLIC VOLUME (MOD BIPLANE): 89 ML
LEFT VENTRICLE SYSTOLIC VOLUME INDEX (MOD BIPLANE): 46.4 ML/M2
LEFT VENTRICULAR INTERNAL DIMENSION IN DIASTOLE: 4.6 CM (ref 3.5–6)
LEFT VENTRICULAR POSTERIOR WALL IN END DIASTOLE: 1.1 CM
LEFT VENTRICULAR STROKE VOLUME: 54 ML
LV EF BIPLANE MOD: 57 %
LV EF US.2D.A4C+ESTIMATED: 58 %
LVSV (TEICH): 54 ML
MV E'TISSUE VEL-LAT: 25 CM/S
MV E'TISSUE VEL-SEP: 16 CM/S
MV PEAK E VEL: 87 CM/S
MV STENOSIS PRESSURE HALF TIME: 48 MS
MV VALVE AREA P 1/2 METHOD: 4.58
RA PRESSURE ESTIMATED: 3 MMHG
RIGHT VENTRICLE ID DIMENSION: 4.8 CM
SL CV LV EF: 60
SL CV PED ECHO LEFT VENTRICLE DIASTOLIC VOLUME (MOD BIPLANE) 2D: 99 ML
SL CV PED ECHO LEFT VENTRICLE SYSTOLIC VOLUME (MOD BIPLANE) 2D: 45 ML
TRICUSPID ANNULAR PLANE SYSTOLIC EXCURSION: 2.4 CM

## 2025-06-06 PROCEDURE — 93356 MYOCRD STRAIN IMG SPCKL TRCK: CPT

## 2025-06-06 PROCEDURE — 93306 TTE W/DOPPLER COMPLETE: CPT | Performed by: INTERNAL MEDICINE

## 2025-06-06 PROCEDURE — 93306 TTE W/DOPPLER COMPLETE: CPT

## 2025-06-06 PROCEDURE — 93356 MYOCRD STRAIN IMG SPCKL TRCK: CPT | Performed by: INTERNAL MEDICINE

## 2025-06-30 ENCOUNTER — OFFICE VISIT (OUTPATIENT)
Dept: DERMATOLOGY | Facility: CLINIC | Age: 20
End: 2025-06-30
Payer: COMMERCIAL

## 2025-06-30 DIAGNOSIS — L70.0 ACNE VULGARIS: Primary | ICD-10-CM

## 2025-06-30 PROCEDURE — 99214 OFFICE O/P EST MOD 30 MIN: CPT

## 2025-06-30 NOTE — PROGRESS NOTES
"[facility] Note     Patient Name: Amrit Dolan  Encounter Date: 6/30/25       Have you been cared for by a [facility] Dermatologist in the last 3 years and, if so, which description applies to you? Yes. I have been here within the last 3 years, and my medical history has NOT changed since that time. I am not of child-bearing potential.     REVIEW OF SYSTEMS:  Have you recently had or currently have any of the following? No changes in my recent health.   PAST MEDICAL HISTORY:  Have you personally ever had or currently have any of the following?  If \"YES,\" then please provide more detail. No changes in my medical history.   HISTORY OF IMMUNOSUPPRESSION: Do you have a history of any of the following:  Systemic Immunosuppression such as Diabetes, Biologic or Immunotherapy, Chemotherapy, Organ Transplantation, Bone Marrow Transplantation or Prednisone?  No     Answering \"YES\" requires the addition of the dotphrase \"IMMUNOSUPPRESSED\" as the first diagnosis of the patient's visit.   FAMILY HISTORY:  Any \"first degree relatives\" (parent, brother, sister, or child) with the following?    No changes in my family's known health.   PATIENT EXPERIENCE:    Do you want the Dermatologist to perform a COMPLETE skin exam today including a clinical examination under the \"bra and underwear\" areas?  NO  If necessary, do we have your permission to call and leave a detailed message on your Preferred Phone number that includes your specific medical information?  Yes      Allergies[1] Current Medications[2]        Whom besides the patient is providing clinical information about today's encounter?   NO ADDITIONAL HISTORIAN (patient alone provided history)    Physical Exam and Assessment/Plan by Diagnosis:    ISOTRETINOIN \"ACCUTANE\"-re-enroll in Ipledge    Age: 19 y.o.  Weight (in KILOgrams): 72.6 kg    Ipledge number: [id number]      \"Goal Dose\" in mg (150-180 mg x weight in kg): 10,890 mg - 13,068 mg    Interim " "month  \"Daily Dose\" of Isotretinoin the patient has actually been taking since last visit (this is usually what was prescribed): 0 mg  \"Total # of Days\" patient took Isotretinoin since last visit (this is usually 30):  0 days  \"Total Monthly Dose\" in mg since last visit (this equals \"Daily Dose\" x \"Total # of Days\"): 0 mg    Cumulative dosage  \"Total cumulative Dose\" in mg (add the above \"Total Monthly Dose\" with \"Total Cumulative Dose\" from last visit: 0 mg    Symptoms  Conjunctivitis: No  Night Blindness/ Issues with night vision: No  Focusing Problems: No  Dry Lips/Eyes: No  Dry Skin: No  Rash: No  Nose Bleeds: No  Angular cheilitis (cracking in corner of lips): No  Headaches: No  Photosensitivity: No  Depression: No  Mood Changes: No  Suicidal thoughts: No  Fatigue: No  Weight Loss: No  Muscle Pain/Stiffness: No  Abdominal pain: No  Diarrhea: No  Bloody stools: No    Physical Exam  Anatomic Location Affected:  face, chest, back  Morphological Description: open and closed comedones, erythematous papules, pustules, nodules, scarring    DIAGNOSES:    Acne Vulgaris  High Risk Medication - initiating Accutane therapy   Xerosis (see below for patient education)    Assessment and Plan:  Patient last seen by me 3/3/25 and plan for initiation of 30mg accutane. Labs completed and all WNL. Patient got PRP injection and held off on starting accutane but returns to office today to re-initiate.       Acne vulgaris, initiation of isotretinoin   Patient re-enrolled in ipledge and paper consent form signed.   Planned daily dose for next 30 days: 30mg  - Confirmed patient status and counseling in iPLEDGE today  - Gentle skin care and regular SPF use. Stop all other acne treatments while on isotretinoin   - Recommend daily emollient for lips and a moisturizing shampoo/conditioner for hair  - Side effects including but not limited to serious allergic reaction, thrombosis, mood changes, skeletal hyperostosis, premature epiphyseal " closure, IBD, bone and muscle pain, HA, hyperlipidemia, photosensitivity, hepatotoxicity, dry skin and eyes, hair loss, teratogenicity (pregnancy category X) risk for up to 1 month after stopping medicine, 20-30% risk of recurrence were reviewed.  - The patient understands not to donate blood, drink excessive amounts of alcohol, or share the medication with any individuals   - Labs next due:  2 months  Follow up in 1 month      Patients counseled:  Cannot donate blood while taking isotretinoin and for 1 month after completing therapy. YES  Do not share medication with anyone. YES  Report any side effects of mood swings or depression and stop medication upon occurrence. YES  Patient needs to confirm counseling in iPledge prior to picking up prescription. YES        Isotretinoin for Acne   Isotretinoin is a retinoid medication that is taken by mouth to treat severe nodular acne. Typically, it is used once other acne treatments have not worked, such as oral antibiotics. Usually isotretinoin is taken for 4 to 6 months, although the length of treatment can vary from person to person.  While most patient’s acne improves and may even clear with this medication, in 20% of patients acne can come back. This requires additional acne treatment or even a second cycle of isotretinoin.     How should I take isotretinoin?   Isotretinoin dosing is weight-based and should be taken exactly as prescribed.    If you miss a dose, skip that dose. Do not take 2 doses at the same time.    Take with fatty food to help with absorption.  You will get a 30 day supply of isotretinoin at a time. It cannot be automatically refilled.  Make certain that you have been given enough medication to last 30 days as pharmacists are unable to refill or make changes within a month.  You must return to your dermatologist every month to make sure you are not having any serious side effects from isotretinoin. For female patients, this visit will always include  a monthly pregnancy test. Other laboratory studies, including liver function tests, cholesterol and triglycerides, must also be conducted before and during treatment.     What should I avoid while taking isotretinoin?   Do not get pregnant from one month prior or 1 month following taking any isotretinoin.   Do not donate blood while take isotretinoin or until 1 months after coming off the medication.   Do not have cosmetic procedures to smooth your skin, including waxing, dermabrasion, or laser procedures, while taking this medication.    Do not share isotretinoin with any other people. It can cause birth defects and other serious health problems.   Do not use any other acne medications, including medicated washes, cleansers, creams or antibiotic pills during your treatment with isotretinoin unless expressly directed to by your dermatologist.     Initiating isotretinoin & the iPLEDGE Program   The iPLEDGE Program is a strict, government-required program to prevent females from becoming pregnant while on isotretinoin. All females and males must participate. Note: Your provider must follow this program and cannot change any of the requirements.   If you fail to keep appointments, you will be unable to get your prescription filled.       What are the possible side effects of isotretinoin? What should I do?   Most side effects from isotretinoin are mild and can be easily improved with simple remedies.  Others are more concerning.   Dry skin and eyes, chapped lips and dry nose that may lead to nosebleeds.    Dry Skin: Apply sensitive skin moisturizers to dry skin at least two times a day. You may need sunscreen (SPF 30) in the morning and to reapply when outside.    Dry Eyes: Use saline eye drops or artificial tears.    Dry Nose/Nosebleeds: Use saline nasal spray (ex. Ayr) during the day and at bedtime. To stop nosebleeds, hold pressure.  If this does not work, call your dermatologist.    Chapped lips: apply  petrolatum-based lip balms routinely. Avoid anything “medicated.” Contact your dermatologist for excessive dryness, cracks, tenderness or pain.   Increased blood fats and cholesterol (usually in patients with a personal or family history of cholesterol or triglyceride problems).    Vision problems affecting your ability to see in the dark and drive at night.   Bone, muscle and tendon aches can occur. Additional stretching before and after activities may help relieve aches.  If you are otherwise healthy, consider the use of ibuprofen or naproxen.  If you are unsure if you can use these pain medications, ask your doctor first. Also, call your doctor if you experience severe back pain, joint pain, or a broken bone   Changes in mood, including anxiety, depressive symptoms or suicidal thoughts which may or may not be temporary.  Notify your doctor if your or a family member have suffered from these conditions or if you have any concerns during treatment.   Serious birth defects or miscarriage can occur while taking this medication and for one month after taking your last dose of isotretinoin. You must not get pregnant while taking isotretinoin. Once the medication is out of your system, 30 days, there is no effect on the baby.   Increased pressure in the brain. Call your doctor right away if you experience bad headache, blurred vision, dizziness, nausea or vomiting, or seizures.   Skin rash - call your doctor right away if you develop any rashes or blisters on the skin.   Liver damage - call your doctor right away if you experience severe stomach, chest or bowel pain, painful swallowing, diarrhea, blood in your stool, yellowing of your skin or eyes, or dark urine.   Gastrointestinal bleeding. If you experience unusual abdominal pain or red or black/tarry stools, call your doctor immediately.   Worsening acne. Mild worsening of acne can occur in the first few weeks of using isotreinoin. If your acne is getting  significantly worse, call your doctor.    Scribe Attestation    I,:  Washington Morales am acting as a scribe while in the presence of the attending physician.:       I,:  Codi Aguilar PA-C personally performed the services described in this documentation    as scribed in my presence.:                  [1]  No Known Allergies[2]    Current Outpatient Medications:   •  naproxen (Naprosyn) 500 mg tablet, Take 1 tablet (500 mg total) by mouth 2 (two) times a day as needed for moderate pain (with meals), Disp: 30 tablet, Rfl: 0  •  clindamycin (CLEOCIN T) 1 % lotion, Apply topically in the morning To face and affected areas of back. (Patient not taking: Reported on 5/9/2025), Disp: 60 mL, Rfl: 3  •  ISOtretinoin (ACCUTANE) 30 MG capsule, Take 1 capsule (30 mg total) by mouth daily With a glass of water and a fatty meal. (Patient taking differently: Take 30 mg by mouth daily With a glass of water and a fatty meal. Not taking yet as of 5/9/25), Disp: 30 capsule, Rfl: 0  •  meloxicam (MOBIC) 15 mg tablet, TAKE 1 TABLET BY MOUTH DAILY FOR 14 DAYS, THEN AS NEEDED (Patient not taking: Reported on 5/9/2025), Disp: , Rfl:   •  Meloxicam 15 MG TBDP, , Disp: , Rfl:   •  nitroglycerin (NITRODUR) 0.2 mg/hr, , Disp: , Rfl:   •  oxyCODONE-acetaminophen (PERCOCET) 5-325 mg per tablet, Take 1 tablet by mouth every 6 (six) hours as needed (Patient not taking: Reported on 5/9/2025), Disp: , Rfl:

## 2025-07-02 ENCOUNTER — OFFICE VISIT (OUTPATIENT)
Dept: PHYSICAL THERAPY | Facility: CLINIC | Age: 20
End: 2025-07-02
Attending: FAMILY MEDICINE
Payer: COMMERCIAL

## 2025-07-02 DIAGNOSIS — M25.572 CHRONIC PAIN OF LEFT ANKLE: Primary | ICD-10-CM

## 2025-07-02 DIAGNOSIS — G89.29 CHRONIC PAIN OF LEFT ANKLE: Primary | ICD-10-CM

## 2025-07-02 PROCEDURE — 97140 MANUAL THERAPY 1/> REGIONS: CPT | Performed by: PHYSICAL THERAPIST

## 2025-07-02 PROCEDURE — 97112 NEUROMUSCULAR REEDUCATION: CPT | Performed by: PHYSICAL THERAPIST

## 2025-07-02 NOTE — PROGRESS NOTES
Daily Note     Today's date: 2025  Patient name: Amrit Dolan  : 2005  MRN: [id number]  Referring provider: Mary Gabriel DO  Dx:   Encounter Diagnosis     ICD-10-CM    1. Chronic pain of left ankle  M25.572     G89.29                      Subjective: Scot explains that he feels he is turning a corner and excited to continue his rehab process back at school next week.      Objective: See treatment diary below      Assessment: Tolerated treatment well. Patient demonstrated fatigue post treatment and exhibited good technique with therapeutic exercises. Scot responded well to all TE, added in single leg stance on foam and ball tosses in semi-tandem. He will be returning to school and completing his course of physical therapy as he returns to running and going into preseason. DC from this episode of PT      Plan: DC from PT     Precautions: None        Manuals 5/21 5/28 6/5 6/18  12/31  3/4  6/5  7       IASTM L Gastroc/Soleus RN RN RN RN  RN  RN  RN  RN       Tiger tail calf RN RN RN RN  RN  RN           Theragun          RN  RN                                   Neuro Re-Ed                        SL BOSU balance            5x15''            BOSU squats           2x10            Tandem stance EC on foam              3x30''  3x30''        Bulg split squats              x10  x10        RDLs             x10   x10        lat/post sliders             X10 ea  x10 ea       SLS foam        3x15'' ea     Semi tandem foam ball toss        2x10 ea direction                                          Ther Ex                       Standing gastroc/soleus s' 5x15'' ea Pro stretch 3x30'' Pro stretch 3x30'' Pro stretch 3x30'' Pro stretch 3x30''  Pro stretch 3x30''      3x30''       Foam rolling calf hep     5'              Ice prn hep                     Self IASTM hep                                            Y balance                       Eccentric heel raises     2x10      3x10 lvl 1    3x10 lvl 1                                Ther Activity                                                                       Gait Training                       Running form   5' midfoot strike pattern                   Krysta education   RN                   Education on nature of condition     RN RN               Modalities

## 2025-07-03 ENCOUNTER — OFFICE VISIT (OUTPATIENT)
Dept: FAMILY MEDICINE CLINIC | Facility: CLINIC | Age: 20
End: 2025-07-03
Payer: COMMERCIAL

## 2025-07-03 VITALS
TEMPERATURE: 98.7 F | HEART RATE: 69 BPM | WEIGHT: 162.2 LBS | RESPIRATION RATE: 16 BRPM | HEIGHT: 71 IN | OXYGEN SATURATION: 97 % | SYSTOLIC BLOOD PRESSURE: 120 MMHG | DIASTOLIC BLOOD PRESSURE: 70 MMHG | BODY MASS INDEX: 22.71 KG/M2

## 2025-07-03 DIAGNOSIS — M25.552 BILATERAL HIP PAIN: Primary | ICD-10-CM

## 2025-07-03 DIAGNOSIS — M25.551 BILATERAL HIP PAIN: Primary | ICD-10-CM

## 2025-07-03 PROCEDURE — 99213 OFFICE O/P EST LOW 20 MIN: CPT | Performed by: FAMILY MEDICINE

## 2025-07-08 NOTE — PROGRESS NOTES
Cardiology     Clinic Visit Note  Amrit Dolan 19 y.o. male   MRN: [id number]    Assessment & Plan  Chest pain, unspecified type  Patient presented to ED with intermittent chest pain on 5/11/2025. Patient described that pain increased with inhalation, located in the left lower sternal border, not radiation.  Constant and lasted about 7 days. Improved after NSAIDs. No new chest pain since then. Patient denies chest pain or SOB on exertion.  Troponin 3, CBC and CMP unremarkable.   EKG on 5/9/2025 revealed sinus mary beth, HR 56.  RBBB, RAD, S1Q3T3.   6/6/2025 TTE: LVEF 60%, normal wall thickness, normal diastolic function. RV normal size, systolic function normal. GLS -23%.  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  Patient is very active, runs 8-10 miles a day for the past 8 years. Currently patient is not running due to Achilles tendinitis.  Patient will start exercise again.   Orders:    Ambulatory Referral to Cardiology          Plan/Recommendations:  Chest pain is likely not cardiac origin, low suspicious for pericarditis. Patient is very active. Very low pretest probability for CAD. We will hold stress testing.   2.   I advised patient to call us or go to ED if he develops chest pain again. I also discussed the mobile structure in the right atrium on echo is likely Chiari remnant. No further management needed for now.           Schedule a follow-up appointment as needed.     Chief Complaint:   Chief Complaint   Patient presents with    New Patient Visit     Pt is new to our practice.     Chest Pain     Last pain was 2 months ago that lasted about a week, hasn't had any since.       Subjective     History of Present Illness:  Amrit Dolan is a 19 y.o. year old male with a past medical history of Acne who presented to the office for the evaluation of chest pain referred by ED. Patient presented to ED with intermittent chest pain on 5/11/2025. Patient described that pain increased with inhalation. located  in the left lower sternal border, not radiation.  Constant and lasted about 7 days. Improved after NSAIDs. No new chest pain since then. At ED, Troponin 3, CBC and CMP unremarkable. EKG revealed sinus mary beth, HR 56.  RBBB, RAD, S1Q3T3. CXR no acute cardiopulmonary disease. Patient was discharged NSAIDs and considering costochondritis. Patient saw PCP and  subsequent TTE revealed LVEF 60%, normal wall thickness, normal diastolic function. RV normal size, systolic function normal. GLS -23%    Patient is very active, runs 8-10 miles a day for the past 8 years. Currently patient is not running due to Achilles tendinitis.  Patient will start exercise again.     Patient does not smoke, and rarely drinks.     Previous Cardiology Workup:    EKG:    Encounter Date: 05/11/25   ECG 12 lead   Result Value    Ventricular Rate 52    Atrial Rate 52    MD Interval 174    QRSD Interval 118    QT Interval 400    QTC Interval 372    P Axis 83    QRS Axis 96    T Wave Axis 62    Narrative    Sinus bradycardia  Rightward axis  Pulmonary disease pattern  Incomplete right bundle branch block  Abnormal ECG  When compared with ECG of 09-May-2025 14:18,  No significant change was found  Confirmed by Viktor Edwards ([id number]) on 5/11/2025 9:02:21 PM       STRESS TEST:  No results found for this or any previous visit.     No results found for this or any previous visit.    No results found for this or any previous visit.      Cath/PCI:  No results found for this or any previous visit.    No results found for this or any previous visit.    No results found for this or any previous visit.      ECHO:  No results found for this or any previous visit.    Results for orders placed during the hospital encounter of 06/06/25    Echo complete w/ contrast if indicated    Interpretation Summary    Left Ventricle: Left ventricular cavity size is normal. Wall thickness is normal. The left ventricular ejection fraction is 60%. Systolic function is normal. Wall  motion is normal. Diastolic function is normal.    Right Ventricle: Right ventricular cavity size is normal. Systolic function is normal.    Strain was performed to quantify interventricular dyssynchrony and evaluate components of myocardial function due to chest pain, abnormal ECG . Results from the utilization of Strain Analysis are listed in the report below.    No results found for this or any previous visit.      OLIVIA:  No results found for this or any previous visit.    No results found for this or any previous visit.      CMR:  No results found for this or any previous visit.    No results found for this or any previous visit.    No results found for this or any previous visit.      HOLTER  No results found for this or any previous visit.    No results found for this or any previous visit.      CAROTIDS  No results found for this or any previous visit.         ---------------------------------------------------------------------------------  Review of Systems   Constitutional:  Negative for chills and fever.   HENT:  Negative for congestion, rhinorrhea, sneezing and sore throat.    Eyes:  Negative for pain and discharge.   Respiratory:  Negative for cough, chest tightness, shortness of breath and wheezing.    Cardiovascular:  Negative for chest pain and leg swelling.   Gastrointestinal:  Negative for abdominal pain, nausea and vomiting.   Endocrine: Negative for polydipsia, polyphagia and polyuria.   Genitourinary:  Negative for flank pain, frequency and urgency.   Musculoskeletal:  Negative for arthralgias, back pain and joint swelling.   Skin:  Negative for color change and pallor.   Neurological:  Negative for dizziness, weakness, light-headedness and headaches.   Psychiatric/Behavioral:  Negative for agitation and confusion.        Current Medications[1]  Past Medical History[2]  Past Surgical History[3]  Social History     Socioeconomic History    Marital status: Single     Spouse name: Not on file     "Number of children: Not on file    Years of education: Not on file    Highest education level: Not on file   Occupational History    Not on file   Tobacco Use    Smoking status: Never     Passive exposure: Never    Smokeless tobacco: Never   Vaping Use    Vaping status: Never Used   Substance and Sexual Activity    Alcohol use: Never    Drug use: Never    Sexual activity: Never   Other Topics Concern    Not on file   Social History Narrative    Not on file     Social Drivers of Health     Financial Resource Strain: Not on file   Food Insecurity: No Food Insecurity (6/28/2021)    Received from [facility]    Hunger Vital Sign     Within the past 12 months, you worried that your food would run out before you got the money to buy more.: Never true     Within the past 12 months, the food you bought just didn't last and you didn't have money to get more.: Never true   Transportation Needs: Not on file   Physical Activity: Not on file   Stress: Not on file   Social Connections: Not on file   Intimate Partner Violence: Not At Risk (6/30/2022)    Humiliation, Afraid, Rape, and Kick questionnaire     Fear of Current or Ex-Partner: No     Emotionally Abused: No     Physically Abused: No     Sexually Abused: No   Housing Stability: Not on file     Family History[4]  Allergies[5]    Objective     Vitals:    07/11/25 1046   BP: 128/82   BP Location: Right arm   Patient Position: Sitting   Cuff Size: Standard   Pulse: 70   SpO2: 98%   Weight: 72.6 kg (160 lb)   Height: 5' 11\" (1.803 m)       Physical exam:     Physical Exam  HENT:      Head: Normocephalic.     Eyes:      Pupils: Pupils are equal, round, and reactive to light.       Cardiovascular:      Rate and Rhythm: Normal rate and regular rhythm.      Heart sounds: No murmur heard.  Pulmonary:      Effort: Pulmonary effort is normal. No respiratory distress.      Breath sounds: No wheezing or rales.   Abdominal:      Palpations: Abdomen is soft.      " Tenderness: There is no abdominal tenderness.     Musculoskeletal:         General: No swelling. Normal range of motion.      Cervical back: Normal range of motion.      Right lower leg: No edema.      Left lower leg: No edema.     Skin:     General: Skin is warm.     Neurological:      Mental Status: He is alert and oriented to person, place, and time.     Psychiatric:         Mood and Affect: Mood normal.           ==  PLEASE NOTE:  This encounter was completed utilizing the [employer] (app)/[employer] Direct Speech Voice Recognition Software. Grammatical errors, random word insertions, pronoun errors and incomplete sentences are occasional consequences of the system due to software limitations, ambient noise and hardware issues.These may be missed by proof reading prior to affixing electronic signature. Any questions or concerns about the content, text or information contained within the body of this dictation should be directly addressed to the physician for clarification. Please do not hesitate to call me directly if you have any any questions or concerns.          [1]   Current Outpatient Medications:     ISOtretinoin (ACCUTANE) 30 MG capsule, Take 1 capsule (30 mg total) by mouth daily With a glass of water and a fatty meal., Disp: 30 capsule, Rfl: 0    naproxen (Naprosyn) 500 mg tablet, Take 1 tablet (500 mg total) by mouth 2 (two) times a day as needed for moderate pain (with meals), Disp: 30 tablet, Rfl: 0    clindamycin (CLEOCIN T) 1 % lotion, Apply topically in the morning To face and affected areas of back. (Patient not taking: Reported on 5/9/2025), Disp: 60 mL, Rfl: 3    meloxicam (MOBIC) 15 mg tablet, TAKE 1 TABLET BY MOUTH DAILY FOR 14 DAYS, THEN AS NEEDED (Patient not taking: Reported on 5/9/2025), Disp: , Rfl:     Meloxicam 15 MG TBDP, , Disp: , Rfl:     nitroglycerin (NITRODUR) 0.2 mg/hr, , Disp: , Rfl:     oxyCODONE-acetaminophen (PERCOCET) 5-325 mg per tablet, Take 1 tablet by mouth every 6 (six) hours as  needed (Patient not taking: Reported on 5/9/2025), Disp: , Rfl:   [2]   Past Medical History:  Diagnosis Date    Acne 2020   [3] No past surgical history on file.  [4]   Family History  Problem Relation Name Age of Onset    Autoimmune disease Mother Sarah Dolan     Acne Father Amrit Dolan Jr.     Arthritis Father Amrit Dolan Jr.     Diabetes Maternal Grandfather David Malseed         Type 2    Cancer Maternal Grandfather David Malseed     Hypertension Maternal Grandfather David Malseed     Acne Maternal Grandmother Frances Malseed     Hypertension Maternal Grandmother Frances Malseed     Acne Maternal Uncle Jd Malseed    [5] No Known Allergies

## 2025-07-11 ENCOUNTER — OFFICE VISIT (OUTPATIENT)
Dept: CARDIOLOGY CLINIC | Facility: CLINIC | Age: 20
End: 2025-07-11
Payer: COMMERCIAL

## 2025-07-11 VITALS
BODY MASS INDEX: 22.4 KG/M2 | SYSTOLIC BLOOD PRESSURE: 128 MMHG | HEART RATE: 70 BPM | DIASTOLIC BLOOD PRESSURE: 82 MMHG | WEIGHT: 160 LBS | OXYGEN SATURATION: 98 % | HEIGHT: 71 IN

## 2025-07-11 DIAGNOSIS — R07.9 CHEST PAIN, UNSPECIFIED TYPE: Primary | ICD-10-CM

## 2025-07-11 PROCEDURE — 99203 OFFICE O/P NEW LOW 30 MIN: CPT | Performed by: INTERNAL MEDICINE

## 2025-07-30 ENCOUNTER — TELEMEDICINE (OUTPATIENT)
Dept: DERMATOLOGY | Facility: CLINIC | Age: 20
End: 2025-07-30
Payer: COMMERCIAL

## 2025-07-30 DIAGNOSIS — Z79.2 ON ISOTRETINOIN THERAPY: ICD-10-CM

## 2025-07-30 DIAGNOSIS — Z79.899 HIGH RISK MEDICATION USE: ICD-10-CM

## 2025-07-30 DIAGNOSIS — L70.0 ACNE VULGARIS: Primary | ICD-10-CM

## 2025-07-30 PROCEDURE — 98006 SYNCH AUDIO-VIDEO EST MOD 30: CPT

## 2025-07-30 RX ORDER — ISOTRETINOIN 40 MG/1
40 CAPSULE ORAL DAILY
Qty: 30 CAPSULE | Refills: 0 | Status: SHIPPED | OUTPATIENT
Start: 2025-07-30

## 2025-08-01 ENCOUNTER — APPOINTMENT (OUTPATIENT)
Dept: LAB | Facility: CLINIC | Age: 20
End: 2025-08-01
Payer: COMMERCIAL

## 2025-08-01 ENCOUNTER — OFFICE VISIT (OUTPATIENT)
Dept: FAMILY MEDICINE CLINIC | Facility: CLINIC | Age: 20
End: 2025-08-01
Payer: COMMERCIAL

## 2025-08-01 VITALS
TEMPERATURE: 97.8 F | DIASTOLIC BLOOD PRESSURE: 72 MMHG | WEIGHT: 162.2 LBS | HEIGHT: 71 IN | BODY MASS INDEX: 22.71 KG/M2 | OXYGEN SATURATION: 99 % | SYSTOLIC BLOOD PRESSURE: 130 MMHG | HEART RATE: 60 BPM | RESPIRATION RATE: 16 BRPM

## 2025-08-01 DIAGNOSIS — R19.8 CHANGE IN BOWEL FUNCTION: ICD-10-CM

## 2025-08-01 DIAGNOSIS — R10.84 GENERALIZED ABDOMINAL PAIN: ICD-10-CM

## 2025-08-01 DIAGNOSIS — R10.84 GENERALIZED ABDOMINAL PAIN: Primary | ICD-10-CM

## 2025-08-01 DIAGNOSIS — R21 RASH: ICD-10-CM

## 2025-08-01 LAB
ALBUMIN SERPL BCG-MCNC: 4.3 G/DL (ref 3.5–5)
ALP SERPL-CCNC: 112 U/L (ref 34–104)
ALT SERPL W P-5'-P-CCNC: 17 U/L (ref 7–52)
ANION GAP SERPL CALCULATED.3IONS-SCNC: 8 MMOL/L (ref 4–13)
AST SERPL W P-5'-P-CCNC: 23 U/L (ref 13–39)
BASOPHILS # BLD AUTO: 0.03 THOUSANDS/ÂΜL (ref 0–0.1)
BASOPHILS NFR BLD AUTO: 1 % (ref 0–1)
BILIRUB SERPL-MCNC: 0.69 MG/DL (ref 0.2–1)
BILIRUB UR QL STRIP: NEGATIVE
BUN SERPL-MCNC: 17 MG/DL (ref 5–25)
CALCIUM SERPL-MCNC: 9.7 MG/DL (ref 8.4–10.2)
CHLORIDE SERPL-SCNC: 103 MMOL/L (ref 96–108)
CLARITY UR: CLEAR
CO2 SERPL-SCNC: 29 MMOL/L (ref 21–32)
COLOR UR: YELLOW
CREAT SERPL-MCNC: 0.61 MG/DL (ref 0.6–1.3)
EOSINOPHIL # BLD AUTO: 0.1 THOUSAND/ÂΜL (ref 0–0.61)
EOSINOPHIL NFR BLD AUTO: 2 % (ref 0–6)
ERYTHROCYTE [DISTWIDTH] IN BLOOD BY AUTOMATED COUNT: 12.2 % (ref 11.6–15.1)
GFR SERPL CREATININE-BSD FRML MDRD: 142 ML/MIN/1.73SQ M
GLUCOSE P FAST SERPL-MCNC: 59 MG/DL (ref 65–99)
GLUCOSE UR STRIP-MCNC: NEGATIVE MG/DL
HCT VFR BLD AUTO: 42.3 % (ref 36.5–49.3)
HGB BLD-MCNC: 14 G/DL (ref 12–17)
HGB UR QL STRIP.AUTO: NEGATIVE
IGA SERPL-MCNC: 363 MG/DL (ref 66–433)
IMM GRANULOCYTES # BLD AUTO: 0.01 THOUSAND/UL (ref 0–0.2)
IMM GRANULOCYTES NFR BLD AUTO: 0 % (ref 0–2)
KETONES UR STRIP-MCNC: NEGATIVE MG/DL
LEUKOCYTE ESTERASE UR QL STRIP: NEGATIVE
LIPASE SERPL-CCNC: 20 U/L (ref 11–82)
LYMPHOCYTES # BLD AUTO: 1.25 THOUSANDS/ÂΜL (ref 0.6–4.47)
LYMPHOCYTES NFR BLD AUTO: 23 % (ref 14–44)
MCH RBC QN AUTO: 32.7 PG (ref 26.8–34.3)
MCHC RBC AUTO-ENTMCNC: 33.1 G/DL (ref 31.4–37.4)
MCV RBC AUTO: 99 FL (ref 82–98)
MONOCYTES # BLD AUTO: 0.66 THOUSAND/ÂΜL (ref 0.17–1.22)
MONOCYTES NFR BLD AUTO: 12 % (ref 4–12)
NEUTROPHILS # BLD AUTO: 3.42 THOUSANDS/ÂΜL (ref 1.85–7.62)
NEUTS SEG NFR BLD AUTO: 62 % (ref 43–75)
NITRITE UR QL STRIP: NEGATIVE
NRBC BLD AUTO-RTO: 0 /100 WBCS
PH UR STRIP.AUTO: 6 [PH]
PLATELET # BLD AUTO: 219 THOUSANDS/UL (ref 149–390)
PMV BLD AUTO: 10.3 FL (ref 8.9–12.7)
POTASSIUM SERPL-SCNC: 4.2 MMOL/L (ref 3.5–5.3)
PROT SERPL-MCNC: 7.1 G/DL (ref 6.4–8.4)
PROT UR STRIP-MCNC: NEGATIVE MG/DL
RBC # BLD AUTO: 4.28 MILLION/UL (ref 3.88–5.62)
SODIUM SERPL-SCNC: 140 MMOL/L (ref 135–147)
SP GR UR STRIP.AUTO: 1.02 (ref 1–1.03)
UROBILINOGEN UR STRIP-ACNC: <2 MG/DL
WBC # BLD AUTO: 5.47 THOUSAND/UL (ref 4.31–10.16)

## 2025-08-01 PROCEDURE — 82785 ASSAY OF IGE: CPT | Performed by: FAMILY MEDICINE

## 2025-08-01 PROCEDURE — 86364 TISS TRNSGLTMNASE EA IG CLAS: CPT

## 2025-08-01 PROCEDURE — 81003 URINALYSIS AUTO W/O SCOPE: CPT

## 2025-08-01 PROCEDURE — 85025 COMPLETE CBC W/AUTO DIFF WBC: CPT

## 2025-08-01 PROCEDURE — 36415 COLL VENOUS BLD VENIPUNCTURE: CPT | Performed by: FAMILY MEDICINE

## 2025-08-01 PROCEDURE — 87086 URINE CULTURE/COLONY COUNT: CPT

## 2025-08-01 PROCEDURE — 86003 ALLG SPEC IGE CRUDE XTRC EA: CPT | Performed by: FAMILY MEDICINE

## 2025-08-01 PROCEDURE — 99214 OFFICE O/P EST MOD 30 MIN: CPT | Performed by: FAMILY MEDICINE

## 2025-08-01 PROCEDURE — 83690 ASSAY OF LIPASE: CPT

## 2025-08-01 PROCEDURE — 82784 ASSAY IGA/IGD/IGG/IGM EACH: CPT

## 2025-08-01 PROCEDURE — 80053 COMPREHEN METABOLIC PANEL: CPT

## 2025-08-01 RX ORDER — NYSTATIN 100000 [USP'U]/G
POWDER TOPICAL 3 TIMES DAILY
Qty: 60 G | Refills: 0 | Status: SHIPPED | OUTPATIENT
Start: 2025-08-01

## 2025-08-02 LAB — BACTERIA UR CULT: NORMAL

## 2025-08-03 LAB
ALMOND IGE QN: <0.1 KUA/I (ref 0–0.1)
CASHEW NUT IGE QN: <0.1 KUA/I (ref 0–0.1)
CODFISH IGE QN: <0.1 KUA/I (ref 0–0.1)
EGG WHITE IGE QN: <0.1 KUA/I (ref 0–0.1)
GLUTEN IGE QN: <0.1 KUA/I (ref 0–0.1)
HAZELNUT IGE QN: <0.1 KUA/L (ref 0–0.1)
MILK IGE QN: <0.1 KUA/I (ref 0–0.1)
PEANUT IGE QN: <0.1 KUA/I (ref 0–0.1)
SALMON IGE QN: <0.1 KUA/I (ref 0–0.1)
SCALLOP IGE QN: <0.1 KUA/L (ref 0–0.1)
SESAME SEED IGE QN: 0.11 KUA/I (ref 0–0.1)
SHRIMP IGE QN: <0.1 KUA/L (ref 0–0.1)
SOYBEAN IGE QN: <0.1 KUA/I (ref 0–0.1)
TOTAL IGE SMQN RAST: 130 KU/L (ref 0–113)
TTG IGA SER IA-ACNC: 0.4 U/ML (ref ?–10)
TUNA IGE QN: <0.1 KUA/I (ref 0–0.1)
WALNUT IGE QN: 0.23 KUA/I (ref 0–0.1)
WHEAT IGE QN: 0.56 KUA/I (ref 0–0.1)

## 2025-08-07 ENCOUNTER — HOSPITAL ENCOUNTER (OUTPATIENT)
Age: 20
End: 2025-08-07
Payer: COMMERCIAL